# Patient Record
Sex: MALE | Race: WHITE | NOT HISPANIC OR LATINO | Employment: OTHER | URBAN - METROPOLITAN AREA
[De-identification: names, ages, dates, MRNs, and addresses within clinical notes are randomized per-mention and may not be internally consistent; named-entity substitution may affect disease eponyms.]

---

## 2017-08-30 ENCOUNTER — HOSPITAL ENCOUNTER (OUTPATIENT)
Dept: LAB | Facility: MEDICAL CENTER | Age: 77
End: 2017-08-30
Attending: FAMILY MEDICINE
Payer: MEDICARE

## 2017-08-30 ENCOUNTER — OFFICE VISIT (OUTPATIENT)
Dept: MEDICAL GROUP | Facility: PHYSICIAN GROUP | Age: 77
End: 2017-08-30
Payer: MEDICARE

## 2017-08-30 VITALS
HEIGHT: 67 IN | OXYGEN SATURATION: 97 % | DIASTOLIC BLOOD PRESSURE: 98 MMHG | SYSTOLIC BLOOD PRESSURE: 172 MMHG | WEIGHT: 208 LBS | HEART RATE: 72 BPM | TEMPERATURE: 96.6 F | BODY MASS INDEX: 32.65 KG/M2

## 2017-08-30 DIAGNOSIS — R41.81 SENILE: ICD-10-CM

## 2017-08-30 DIAGNOSIS — C85.90 NON-HODGKIN'S LYMPHOMA, UNSPECIFIED BODY REGION, UNSPECIFIED NON-HODGKIN LYMPHOMA TYPE (HCC): ICD-10-CM

## 2017-08-30 DIAGNOSIS — R42 VERTIGO: ICD-10-CM

## 2017-08-30 DIAGNOSIS — K21.9 GASTROESOPHAGEAL REFLUX DISEASE WITHOUT ESOPHAGITIS: ICD-10-CM

## 2017-08-30 DIAGNOSIS — I10 ESSENTIAL HYPERTENSION: ICD-10-CM

## 2017-08-30 LAB
ALBUMIN SERPL BCP-MCNC: 4.8 G/DL (ref 3.2–4.9)
ALBUMIN/GLOB SERPL: 1.7 G/DL
ALP SERPL-CCNC: 87 U/L (ref 30–99)
ALT SERPL-CCNC: 16 U/L (ref 2–50)
ANION GAP SERPL CALC-SCNC: 9 MMOL/L (ref 0–11.9)
AST SERPL-CCNC: 20 U/L (ref 12–45)
BASOPHILS # BLD AUTO: 0.8 % (ref 0–1.8)
BASOPHILS # BLD: 0.04 K/UL (ref 0–0.12)
BILIRUB SERPL-MCNC: 1 MG/DL (ref 0.1–1.5)
BUN SERPL-MCNC: 16 MG/DL (ref 8–22)
CALCIUM SERPL-MCNC: 9.8 MG/DL (ref 8.5–10.5)
CHLORIDE SERPL-SCNC: 106 MMOL/L (ref 96–112)
CHOLEST SERPL-MCNC: 186 MG/DL (ref 100–199)
CO2 SERPL-SCNC: 25 MMOL/L (ref 20–33)
CREAT SERPL-MCNC: 1.1 MG/DL (ref 0.5–1.4)
EOSINOPHIL # BLD AUTO: 0.21 K/UL (ref 0–0.51)
EOSINOPHIL NFR BLD: 4 % (ref 0–6.9)
ERYTHROCYTE [DISTWIDTH] IN BLOOD BY AUTOMATED COUNT: 42.2 FL (ref 35.9–50)
FASTING STATUS PATIENT QL REPORTED: NORMAL
GFR SERPL CREATININE-BSD FRML MDRD: >60 ML/MIN/1.73 M 2
GLOBULIN SER CALC-MCNC: 2.8 G/DL (ref 1.9–3.5)
GLUCOSE SERPL-MCNC: 130 MG/DL (ref 65–99)
HCT VFR BLD AUTO: 44 % (ref 42–52)
HDLC SERPL-MCNC: 27 MG/DL
HGB BLD-MCNC: 15.2 G/DL (ref 14–18)
IMM GRANULOCYTES # BLD AUTO: 0.04 K/UL (ref 0–0.11)
IMM GRANULOCYTES NFR BLD AUTO: 0.8 % (ref 0–0.9)
LDLC SERPL CALC-MCNC: 102 MG/DL
LYMPHOCYTES # BLD AUTO: 1.45 K/UL (ref 1–4.8)
LYMPHOCYTES NFR BLD: 27.7 % (ref 22–41)
MCH RBC QN AUTO: 31.1 PG (ref 27–33)
MCHC RBC AUTO-ENTMCNC: 34.5 G/DL (ref 33.7–35.3)
MCV RBC AUTO: 90 FL (ref 81.4–97.8)
MONOCYTES # BLD AUTO: 0.37 K/UL (ref 0–0.85)
MONOCYTES NFR BLD AUTO: 7.1 % (ref 0–13.4)
NEUTROPHILS # BLD AUTO: 3.12 K/UL (ref 1.82–7.42)
NEUTROPHILS NFR BLD: 59.6 % (ref 44–72)
NRBC # BLD AUTO: 0 K/UL
NRBC BLD AUTO-RTO: 0 /100 WBC
PLATELET # BLD AUTO: 113 K/UL (ref 164–446)
PMV BLD AUTO: 10.7 FL (ref 9–12.9)
POTASSIUM SERPL-SCNC: 4.4 MMOL/L (ref 3.6–5.5)
PROT SERPL-MCNC: 7.6 G/DL (ref 6–8.2)
RBC # BLD AUTO: 4.89 M/UL (ref 4.7–6.1)
SODIUM SERPL-SCNC: 140 MMOL/L (ref 135–145)
TRIGL SERPL-MCNC: 285 MG/DL (ref 0–149)
WBC # BLD AUTO: 5.2 K/UL (ref 4.8–10.8)

## 2017-08-30 PROCEDURE — 36415 COLL VENOUS BLD VENIPUNCTURE: CPT

## 2017-08-30 PROCEDURE — 82306 VITAMIN D 25 HYDROXY: CPT | Mod: GA

## 2017-08-30 PROCEDURE — 85025 COMPLETE CBC W/AUTO DIFF WBC: CPT

## 2017-08-30 PROCEDURE — 80061 LIPID PANEL: CPT

## 2017-08-30 PROCEDURE — 99214 OFFICE O/P EST MOD 30 MIN: CPT | Performed by: FAMILY MEDICINE

## 2017-08-30 PROCEDURE — 80053 COMPREHEN METABOLIC PANEL: CPT

## 2017-08-30 RX ORDER — DIAZEPAM 5 MG/1
5 TABLET ORAL EVERY 12 HOURS PRN
Qty: 30 TAB | Refills: 0 | Status: SHIPPED
Start: 2017-08-30 | End: 2018-07-02 | Stop reason: SDUPTHER

## 2017-08-30 RX ORDER — OMEPRAZOLE 20 MG/1
20 CAPSULE, DELAYED RELEASE ORAL DAILY
COMMUNITY
End: 2021-06-15

## 2017-08-30 ASSESSMENT — PATIENT HEALTH QUESTIONNAIRE - PHQ9: CLINICAL INTERPRETATION OF PHQ2 SCORE: 0

## 2017-08-31 LAB — 25(OH)D3 SERPL-MCNC: 21 NG/ML (ref 30–100)

## 2017-08-31 NOTE — PROGRESS NOTES
"Subjective:   Jerry Whitfield is a 77 y.o. male here today for establishing care and elevated blood pressure     HPI :   Patient is here to establish care.He is here with her daughter.He is generally in good health condition and does not take any daily medications.    He takes diazepam as needed occasionally when he gets vertigo.    He does have a diagnosis of hypertension but he does not take any medications.He does not believe in taking medications.He states that he recently had a few episodes of left sided chest pain.He started taking Prilosec 20 mg daily for the last few days.After taking it, his symptoms resolved.    He also reports that he had an episode of headache recently.Described as a sharp left sided headache with photophobia.It lasted for a few hours and then resolved.Did not have any nausea, vomiting.No weakness, numbness, tingling.    Patient also had NHL for which he was treated in the past.The last time he has seen his Oncologist was 2 years ago.    Current medicines (including changes today)  Current Outpatient Prescriptions   Medication Sig Dispense Refill   • omeprazole (PRILOSEC) 20 MG delayed-release capsule Take 20 mg by mouth every day.     • diazepam (VALIUM) 5 MG Tab Take 1 Tab by mouth every 12 hours as needed (for extreme dizziness ). 30 Tab 0     No current facility-administered medications for this visit.      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    ROS   No chest pain, no shortness of breath, no abdominal pain       Objective:     Blood pressure (!) 172/98, pulse 72, temperature 35.9 °C (96.6 °F), height 1.702 m (5' 7\"), weight 94.3 kg (208 lb), SpO2 97 %. Body mass index is 32.58 kg/m².     PHYSICAL EXAM     GEN: Alert and oriented,well appearing, no acute distress  SKIN: warm, dry to touch, no rashes or lesions in " visible areas  PSYCH: mood and affect normal, judgement normal  EYES: Conjunctiva clear, lids normal,pupils equal round and reactive  ENMT: Normal external nose and ears,EACs normal appearing, TM pearly gray with normal light reflex bilaterally,nasal mucosa and turbinates normal appearing without erythema or edema, lips without lesions,good dentition,oropharynx clear  Neck : Trachea midline, no masses or swelling, no thyromegaly  LYMPHATIC : No cervical or supraclavicular lymphadenopathy  RESPIRATORY : Unlabored respiratory effort, no distress noted, clear to auscultation bilaterally, no wheeze, rhonchi, crackles  CARDIOVASCULAR: RRR, S1 S2 normal, no murmurs , gallop , no carotid bruit, no edema of the extremities  MUSCULOSKELETAL : Normal gait and stance, no obvious abnormalities   NEURO: No overt focal neurologic deficits,sensation intact      Assessment and Plan:   The following treatment plan was discussed    1. Essential hypertension  Uncontrolled  Patient is reluctant to start medication now.Discussed in details the risks of untreated hypertension including MI, stroke etc.  Routine labs ordered.Patient will consider starting medication (discussed lisinopril ) after getting lab results  - CBC WITH DIFFERENTIAL; Future  - COMP METABOLIC PANEL; Future  - LIPID PROFILE; Future    2. Non-Hodgkin's lymphoma, unspecified body region, unspecified non-Hodgkin lymphoma type (CMS-HCC)  Currently in remission  Will obtain records from [facility]    3. Vertigo  Take Valium as needed.  New prescription given today  - diazepam (VALIUM) 5 MG Tab; Take 1 Tab by mouth every 12 hours as needed (for extreme dizziness ).  Dispense: 30 Tab; Refill: 0    4. Gastroesophageal reflux disease without esophagitis  Continue Prilosec 20 mg daily.Symptoms controlled.    5. Senile  - VITAMIN D,25 HYDROXY; Future      Followup: Return in about 4 weeks (around 9/27/2017) for follow up on HTN, labs, headaches, health maint, Long.         Please  note that this dictation was created using voice recognition software. I have made every reasonable attempt to correct obvious errors, but I expect that there are errors of grammar and possibly content that I did not discover before finalizing the note.

## 2017-09-03 DIAGNOSIS — R73.01 ELEVATED FASTING BLOOD SUGAR: ICD-10-CM

## 2017-09-05 ENCOUNTER — TELEPHONE (OUTPATIENT)
Dept: MEDICAL GROUP | Facility: PHYSICIAN GROUP | Age: 77
End: 2017-09-05

## 2017-09-05 NOTE — TELEPHONE ENCOUNTER
----- Message from Patricia Mascorro M.D. sent at 9/3/2017  1:58 PM PDT -----  Please inform patient that his blood shows the following abnormalities :    1. Blood sugar is high (fasting blood sugar is 130, normal < 100). We need to do another test to determine if he has diabetes. I will place an order for that (hbA1C).Please advise him to come to the lab to have it done.    2. Cholesterol is also abnormal (some of it is due to high blood sugar).Good cholesterol (hDL ) is low.    3. Vitamin D is low.He should take Vitamin D 5000 units daily.    4. I also recommend that he start a blood pressure medication as we had discussed at his appointment.    Please make him an appointment to discuss the results in more detail. He should complete the test for diabetes at least 4-5 days before the appointment.Therefore please make him an appointment in the 1-2 weeks.    Patricia Mascorro M.D.

## 2017-09-08 ENCOUNTER — HOSPITAL ENCOUNTER (OUTPATIENT)
Dept: LAB | Facility: MEDICAL CENTER | Age: 77
End: 2017-09-08
Attending: FAMILY MEDICINE
Payer: MEDICARE

## 2017-09-08 DIAGNOSIS — R73.01 ELEVATED FASTING BLOOD SUGAR: ICD-10-CM

## 2017-09-08 LAB
EST. AVERAGE GLUCOSE BLD GHB EST-MCNC: 200 MG/DL
FASTING STATUS PATIENT QL REPORTED: NORMAL
HBA1C MFR BLD: 8.6 % (ref 0–5.6)

## 2017-09-08 PROCEDURE — 36415 COLL VENOUS BLD VENIPUNCTURE: CPT | Mod: GA

## 2017-09-08 PROCEDURE — 83036 HEMOGLOBIN GLYCOSYLATED A1C: CPT | Mod: GA

## 2017-09-21 ENCOUNTER — OFFICE VISIT (OUTPATIENT)
Dept: MEDICAL GROUP | Facility: PHYSICIAN GROUP | Age: 77
End: 2017-09-21
Payer: MEDICARE

## 2017-09-21 VITALS
DIASTOLIC BLOOD PRESSURE: 82 MMHG | SYSTOLIC BLOOD PRESSURE: 146 MMHG | BODY MASS INDEX: 30.16 KG/M2 | TEMPERATURE: 96.8 F | HEIGHT: 68 IN | WEIGHT: 199 LBS | OXYGEN SATURATION: 95 % | HEART RATE: 76 BPM

## 2017-09-21 DIAGNOSIS — E11.9 TYPE 2 DIABETES MELLITUS WITHOUT COMPLICATION, WITHOUT LONG-TERM CURRENT USE OF INSULIN (HCC): ICD-10-CM

## 2017-09-21 DIAGNOSIS — E55.9 VITAMIN D INSUFFICIENCY: ICD-10-CM

## 2017-09-21 DIAGNOSIS — E78.1 HYPERTRIGLYCERIDEMIA: ICD-10-CM

## 2017-09-21 DIAGNOSIS — I10 ESSENTIAL HYPERTENSION: ICD-10-CM

## 2017-09-21 PROCEDURE — 99213 OFFICE O/P EST LOW 20 MIN: CPT | Performed by: FAMILY MEDICINE

## 2017-09-21 RX ORDER — CHLORAL HYDRATE 500 MG
1000 CAPSULE ORAL
COMMUNITY
End: 2021-06-15

## 2017-09-21 ASSESSMENT — PAIN SCALES - GENERAL: PAINLEVEL: NO PAIN

## 2017-09-21 NOTE — ASSESSMENT & PLAN NOTE
Recent labs show he has a vitamin D level of 21. He is currently taking vitamin D about 1000 units daily.

## 2017-09-21 NOTE — ASSESSMENT & PLAN NOTE
This is a new diagnosis based on recent labs. His hemoglobin A1c is 8.6. Patient does not want to try medications at this time. He wants to control his blood sugar with lifestyle changes that he is working on. He does have a son who is diabetic and who will help him to plan out his carbohydrate controlled diet.

## 2017-09-21 NOTE — ASSESSMENT & PLAN NOTE
New problem. Based on recent labs. This is most likely related to his new diagnosis of diabetes. He does not want to do medications currently. He is working on making lifestyle changes and wants to control with diet and exercise.  Results for KELVIN KAUFMAN (MRN [id number]) as of 9/21/2017 12:55   Ref. Range 8/30/2017 12:01   Cholesterol,Tot Latest Ref Range: 100 - 199 mg/dL 186   Triglycerides Latest Ref Range: 0 - 149 mg/dL 285 (H)   HDL Latest Ref Range: >=40 mg/dL 27 (A)   LDL Latest Ref Range: <100 mg/dL 102 (H)

## 2017-09-21 NOTE — ASSESSMENT & PLAN NOTE
Patient has had hypertension but has not been on any medications. On our last visit his blood pressure was as high as 172/98. However he is quite adamant on not taking medications. He states that he has been making a lot of lifestyle changes including diet and trying to start exercising. He has been trying to limit carbohydrate intake. His blood pressure is much better today although still elevated. He would like to continue working on lifestyle changes and trying to control his blood pressure. He is monitoring his blood pressure at home and it has been running between 140s to 150s/70s-80s

## 2017-09-21 NOTE — PROGRESS NOTES
Subjective:   Jerry Whitfield is a 77 y.o. male here today forReviewing recent lab results, follow-up on hypertension and a diagnosis of diabetes    HTN (hypertension)  Patient has had hypertension but has not been on any medications. On our last visit his blood pressure was as high as 172/98. However he is quite adamant on not taking medications. He states that he has been making a lot of lifestyle changes including diet and trying to start exercising. He has been trying to limit carbohydrate intake. His blood pressure is much better today although still elevated. He would like to continue working on lifestyle changes and trying to control his blood pressure. He is monitoring his blood pressure at home and it has been running between 140s to 150s/70s-80s    Vitamin D insufficiency  Recent labs show he has a vitamin D level of 21. He is currently taking vitamin D about 1000 units daily.    Type 2 diabetes mellitus without complication (CMS-[facility])  This is a new diagnosis based on recent labs. His hemoglobin A1c is 8.6. Patient does not want to try medications at this time. He wants to control his blood sugar with lifestyle changes that he is working on. He does have a son who is diabetic and who will help him to plan out his carbohydrate controlled diet.    Hypertriglyceridemia  New problem. Based on recent labs. This is most likely related to his new diagnosis of diabetes. He does not want to do medications currently. He is working on making lifestyle changes and wants to control with diet and exercise.  Results for JERRY WHITFIELD (MRN [id number]) as of 9/21/2017 12:55   Ref. Range 8/30/2017 12:01   Cholesterol,Tot Latest Ref Range: 100 - 199 mg/dL 186   Triglycerides Latest Ref Range: 0 - 149 mg/dL 285 (H)   HDL Latest Ref Range: >=40 mg/dL 27 (A)   LDL Latest Ref Range: <100 mg/dL 102 (H)          Current medicines (including changes today)  Current Outpatient Prescriptions   Medication Sig Dispense  "Refill   • aspirin EC (ECOTRIN) 81 MG Tablet Delayed Response Take 81 mg by mouth every day.     • Omega-3 Fatty Acids (FISH OIL) 1000 MG Cap capsule Take 1,000 mg by mouth 3 times a day, with meals.     • vitamin D (CHOLECALCIFEROL) 1000 UNIT Tab Take 1,000 Units by mouth every day.     • omeprazole (PRILOSEC) 20 MG delayed-release capsule Take 20 mg by mouth every day.     • diazepam (VALIUM) 5 MG Tab Take 1 Tab by mouth every 12 hours as needed (for extreme dizziness ). 30 Tab 0     No current facility-administered medications for this visit.      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    ROS   No chest pain, no shortness of breath, no abdominal pain  No cough, no rash, no weakness, no headache, no visual disturbances     Objective:     Blood pressure 146/82, pulse 76, temperature 36 °C (96.8 °F), height 1.715 m (5' 7.5\"), weight 90.3 kg (199 lb), SpO2 95 %. Body mass index is 30.71 kg/m².     PHYSICAL EXAM     GEN: Alert and oriented,well appearing, no acute distress  SKIN: warm, dry to touch, no rashes or lesions in visible areas  PSYCH: mood and affect normal, judgement normal  EYES: Conjunctiva clear, lids normal,pupils equal round and reactive  ENMT: Normal external nose and ears,EACs normal appearing, TM pearly gray with normal light reflex bilaterally,nasal mucosa and turbinates normal appearing without erythema or edema, lips without lesions,good dentition,oropharynx clear  Neck : Trachea midline, no masses or swelling, no thyromegaly  LYMPHATIC : No cervical or supraclavicular lymphadenopathy  RESPIRATORY : Unlabored respiratory effort, no distress noted, clear to auscultation bilaterally, no wheeze, rhonchi, crackles  CARDIOVASCULAR: RRR, S1 S2 normal,no edema of the extremities  MUSCULOSKELETAL : Normal gait and stance, no obvious " abnormalities   NEURO: No overt focal neurologic deficits,sensation intact        Assessment and Plan:   The following treatment plan was discussed    1. Essential hypertension  BP still elevated but improving.continue lifestyle changes.  Monitor BP at home and bring BP log    2. Vitamin D insufficiency  Patient will take Vitamin D 5000 units daily.Recehck in 6 months    3. Type 2 diabetes mellitus without complication, without long-term current use of insulin (CMS-[facility])  New dx.patient does not want medications.A1c 8.6.Goal < 7.  Patient will continue lifestyle changes, carbohydrate controlled diet and exercise.Monitor BG at home.Discussed goals of FBG < 130 ad PPBG < 200.  Return in 2 months to check A1C (patient will pay for test, since not covered by insurance)    4. Hypertriglyceridemia  Continue lifestyle changes.Recehck labs in 3 months      Followup: Return in about 2 months (around 11/21/2017) for Long, follow up on diabetes, HTN.         Please note that this dictation was created using voice recognition software. I have made every reasonable attempt to correct obvious errors, but I expect that there are errors of grammar and possibly content that I did not discover before finalizing the note.

## 2018-06-19 DIAGNOSIS — R42 VERTIGO: ICD-10-CM

## 2018-06-19 RX ORDER — DIAZEPAM 5 MG/1
TABLET ORAL
Qty: 30 TAB | Refills: 0
Start: 2018-06-19

## 2018-07-02 ENCOUNTER — OFFICE VISIT (OUTPATIENT)
Dept: MEDICAL GROUP | Facility: PHYSICIAN GROUP | Age: 78
End: 2018-07-02
Payer: MEDICARE

## 2018-07-02 VITALS
DIASTOLIC BLOOD PRESSURE: 94 MMHG | HEART RATE: 82 BPM | HEIGHT: 67 IN | WEIGHT: 206 LBS | RESPIRATION RATE: 14 BRPM | BODY MASS INDEX: 32.33 KG/M2 | SYSTOLIC BLOOD PRESSURE: 146 MMHG | TEMPERATURE: 98 F | OXYGEN SATURATION: 96 %

## 2018-07-02 DIAGNOSIS — E11.9 TYPE 2 DIABETES MELLITUS WITHOUT COMPLICATION, WITHOUT LONG-TERM CURRENT USE OF INSULIN (HCC): ICD-10-CM

## 2018-07-02 DIAGNOSIS — E66.9 OBESITY (BMI 30-39.9): ICD-10-CM

## 2018-07-02 DIAGNOSIS — R42 VERTIGO: ICD-10-CM

## 2018-07-02 DIAGNOSIS — D69.6 THROMBOCYTOPENIA (HCC): ICD-10-CM

## 2018-07-02 LAB
HBA1C MFR BLD: 7 % (ref ?–5.8)
INT CON NEG: ABNORMAL
INT CON POS: ABNORMAL

## 2018-07-02 PROCEDURE — 99214 OFFICE O/P EST MOD 30 MIN: CPT | Performed by: FAMILY MEDICINE

## 2018-07-02 PROCEDURE — 83036 HEMOGLOBIN GLYCOSYLATED A1C: CPT | Performed by: FAMILY MEDICINE

## 2018-07-02 RX ORDER — IBUPROFEN 200 MG
200 TABLET ORAL EVERY 6 HOURS PRN
COMMUNITY
End: 2021-06-15

## 2018-07-02 RX ORDER — DIAZEPAM 5 MG/1
5 TABLET ORAL EVERY 12 HOURS PRN
Qty: 30 TAB | Refills: 0 | Status: SHIPPED | OUTPATIENT
Start: 2018-07-02 | End: 2018-08-01

## 2018-07-02 ASSESSMENT — PATIENT HEALTH QUESTIONNAIRE - PHQ9: CLINICAL INTERPRETATION OF PHQ2 SCORE: 0

## 2018-07-02 NOTE — PROGRESS NOTES
"Subjective:   Jerry Whitfield is a 78 y.o. male here today for evaluation and management of:     Vertigo  Chronic problem. He uses a valium rarely on onset of vertigo which helps.   He does not take this everyday. Advised on avoiding alcohol with this medication.   Refills done  and uds reveiwed.     Type 2 diabetes mellitus without complication (CMS-HCC)  Uncontrolled a1c last year was 8.7      Thrombocytopenia  Chronic condition, due for recheck.   He has on blood in stool or gums.   He had non-hodgkin's lymphoma and had chemotherapy for this in the past   Will recheck labs.          Current medicines (including changes today)  Current Outpatient Prescriptions   Medication Sig Dispense Refill   • diazePAM (VALIUM) 5 MG Tab Take 1 Tab by mouth every 12 hours as needed (for extreme dizziness ) for up to 30 days. 30 Tab 0   • aspirin EC (ECOTRIN) 81 MG Tablet Delayed Response Take 81 mg by mouth every day.     • Omega-3 Fatty Acids (FISH OIL) 1000 MG Cap capsule Take 1,000 mg by mouth 3 times a day, with meals.     • vitamin D (CHOLECALCIFEROL) 1000 UNIT Tab Take 1,000 Units by mouth every day.     • ibuprofen (MOTRIN) 200 MG Tab Take 200 mg by mouth every 6 hours as needed.     • omeprazole (PRILOSEC) 20 MG delayed-release capsule Take 20 mg by mouth every day.       No current facility-administered medications for this visit.      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    ROS  No chest pain, no shortness of breath, no abdominal pain       Objective:     Blood pressure 146/94, pulse 82, temperature 36.7 °C (98 °F), resp. rate 14, height 1.702 m (5' 7\"), weight 93.4 kg (206 lb), SpO2 96 %. Body mass index is 32.26 kg/m².   Physical Exam:  Constitutional: Alert, no distress.  Skin: Warm, dry, good turgor, no rashes in visible areas.  Eye: Equal, round " and reactive, conjunctiva clear, lids normal.  ENMT: Lips without lesions, good dentition, oropharynx clear.  Neck: Trachea midline, no masses, no thyromegaly. No cervical or supraclavicular lymphadenopathy  Respiratory: Unlabored respiratory effort, lungs clear to auscultation, no wheezes, no ronchi.  Cardiovascular: Normal S1, S2, no murmur, no edema.  Abdomen: Soft, non-tender, no masses, no hepatosplenomegaly.  Psych: Alert and oriented x3, normal affect and mood.        Assessment and Plan:   The following treatment plan was discussed    1. Obesity (BMI 30-39.9)  - Patient identified as having weight management issue.  Appropriate orders and counseling given.    2. Vertigo  Refill provided on diazepam.     3. Type 2 diabetes mellitus without complication, without long-term current use of insulin (HCC)  Improved due for recheck on labs   - POCT A1C  - Diabetic Monofilament Lower Extremity Exam  - COMP METABOLIC PANEL; Future  - LIPID PROFILE; Future  - MICROALBUMIN CREAT RATIO URINE (LAB COLLECT); Future  - HEMOGLOBIN A1C; Future      Followup: No Follow-up on file.

## 2018-07-02 NOTE — ASSESSMENT & PLAN NOTE
Chronic problem. He uses a valium rarely on onset of vertigo which helps.   He does not take this everyday. Advised on avoiding alcohol with this medication.   Refills done  and uds reveiwed.

## 2018-07-02 NOTE — ASSESSMENT & PLAN NOTE
Chronic condition, due for recheck.   He has on blood in stool or gums.   He had non-hodgkin's lymphoma and had chemotherapy for this in the past   Will recheck labs.

## 2021-01-11 DIAGNOSIS — Z23 NEED FOR VACCINATION: ICD-10-CM

## 2021-06-15 ENCOUNTER — OFFICE VISIT (OUTPATIENT)
Dept: URGENT CARE | Facility: PHYSICIAN GROUP | Age: 81
End: 2021-06-15
Payer: MEDICARE

## 2021-06-15 ENCOUNTER — APPOINTMENT (OUTPATIENT)
Dept: RADIOLOGY | Facility: IMAGING CENTER | Age: 81
End: 2021-06-15
Attending: PHYSICIAN ASSISTANT
Payer: MEDICARE

## 2021-06-15 ENCOUNTER — HOSPITAL ENCOUNTER (OUTPATIENT)
Facility: MEDICAL CENTER | Age: 81
DRG: 661 | End: 2021-06-15
Attending: PHYSICIAN ASSISTANT
Payer: MEDICARE

## 2021-06-15 VITALS
SYSTOLIC BLOOD PRESSURE: 196 MMHG | WEIGHT: 199.08 LBS | BODY MASS INDEX: 31.25 KG/M2 | TEMPERATURE: 97.8 F | HEART RATE: 86 BPM | RESPIRATION RATE: 16 BRPM | DIASTOLIC BLOOD PRESSURE: 62 MMHG | OXYGEN SATURATION: 97 % | HEIGHT: 67 IN

## 2021-06-15 DIAGNOSIS — R10.31 RLQ ABDOMINAL PAIN: ICD-10-CM

## 2021-06-15 DIAGNOSIS — R81 GLYCOSURIA: ICD-10-CM

## 2021-06-15 DIAGNOSIS — R31.9 HEMATURIA, UNSPECIFIED TYPE: ICD-10-CM

## 2021-06-15 LAB
APPEARANCE UR: NORMAL
BILIRUB UR STRIP-MCNC: NEGATIVE MG/DL
COLOR UR AUTO: NORMAL
GLUCOSE BLD-MCNC: 165 MG/DL (ref 70–100)
GLUCOSE UR STRIP.AUTO-MCNC: 100 MG/DL
KETONES UR STRIP.AUTO-MCNC: 15 MG/DL
LEUKOCYTE ESTERASE UR QL STRIP.AUTO: NORMAL
NITRITE UR QL STRIP.AUTO: NEGATIVE
PH UR STRIP.AUTO: 5 [PH] (ref 5–8)
PROT UR QL STRIP: 100 MG/DL
RBC UR QL AUTO: NORMAL
SP GR UR STRIP.AUTO: 1.03
UROBILINOGEN UR STRIP-MCNC: NEGATIVE MG/DL

## 2021-06-15 PROCEDURE — 87086 URINE CULTURE/COLONY COUNT: CPT

## 2021-06-15 PROCEDURE — 74019 RADEX ABDOMEN 2 VIEWS: CPT | Mod: TC,FY | Performed by: PHYSICIAN ASSISTANT

## 2021-06-15 PROCEDURE — 82962 GLUCOSE BLOOD TEST: CPT | Performed by: PHYSICIAN ASSISTANT

## 2021-06-15 PROCEDURE — 81002 URINALYSIS NONAUTO W/O SCOPE: CPT | Performed by: PHYSICIAN ASSISTANT

## 2021-06-15 PROCEDURE — 99214 OFFICE O/P EST MOD 30 MIN: CPT | Performed by: PHYSICIAN ASSISTANT

## 2021-06-15 NOTE — PROGRESS NOTES
Chief Complaint   Patient presents with   • Abdominal Pain     right lower abdomen pain       HISTORY OF PRESENT ILLNESS: Patient is a 81 y.o. male who presents today for the following:    Patient is here with his daughter for evaluation of right lower quadrant pain.  His first episode was Sunday, 6/13, in the morning.  He had pain most of the day Sunday when it resolved.  No pain Monday.  His pain started again this morning with associated nausea.  The pain is constant and sharp.  He states preceding both episodes, the night before, he had the same cheese from the [employer] store.  He had slight provement in his pain this morning after moving his bowels.  He states he typically very regular when moving his bowels has been more irregular, small bowel movements today and Sunday.  Denies fever, chills, body aches.  He denies urinary symptoms and history of renal stones.  He still has an appendix.    Patient Active Problem List    Diagnosis Date Noted   • Obesity (BMI 30-39.9) 07/02/2018   • Vitamin D insufficiency 09/21/2017   • Type 2 diabetes mellitus without complication (HCC) 09/21/2017   • Hypertriglyceridemia 09/21/2017   • Essential hypertension 08/30/2017   • Gastroesophageal reflux disease without esophagitis 08/30/2017   • Senile 08/30/2017   • History of asbestos exposure 06/12/2015   • Elevated fasting glucose 06/12/2015   • Thrombocytopenia (HCC) 06/12/2015   • Vertigo 04/16/2015   • Non-Hodgkin's lymphoma (HCC) 09/04/2013   • HTN (hypertension) 09/04/2013   • Alcohol abuse, in remission 09/04/2013   • B12 deficiency 09/04/2013   • 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    No current [facility]-ordered outpatient medications on file.     No current [facility]-ordered facility-administered medications on file.       Past Medical History:   Diagnosis Date   • Alcohol abuse, in remission 9/4/2013    Quit 1983   • Elevated TSH 9/4/2013   • Hypertension     no meds,  "history of High Bp   • Non-Hodgkin's lymphoma (HCC) 2013    B cell DrDuarte?Sancho, onc bx done by dr alexandre F/u with sancho ?   • Tobacco abuse, in remission 2013    Chewed /7 x 20 years; quit early        Social History     Tobacco Use   • Smoking status: Former Smoker     Packs/day: 0.50     Years: 5.00     Pack years: 2.50     Types: Cigarettes   • Smokeless tobacco: Former User     Types: Chew     Quit date: 1994   • Tobacco comment: quit chew ; chewed x 20 years   Substance Use Topics   • Alcohol use: No   • Drug use: No       Family Status   Relation Name Status   • Mo   at age 90   • Fa   at age 53        esophageal ulcers   • Sis  Alive   • Bro  Alive   • Bro     • Neg Hx  (Not Specified)     Family History   Problem Relation Age of Onset   • Cancer Neg Hx    • Diabetes Neg Hx    • Heart Disease Neg Hx    • Stroke Neg Hx    • Hypertension Neg Hx        Review of Systems:    Constitutional ROS: No unexpected change in weight, No weakness, No fatigue  Eye ROS: No recent significant change in vision, No eye pain, redness, discharge  Ear ROS: No drainage, No tinnitus or vertigo, No recent change in hearing  Mouth/Throat ROS: No teeth or gum problems, No bleeding gums, No tongue complaints  Neck ROS: No swollen glands, No significant pain in neck  Pulmonary ROS: No chronic cough, sputum, or hemoptysis, No dyspnea on exertion, No wheezing  Cardiovascular ROS: No diaphoresis, No edema, No palpitations  GI: Positive for nausea, vomiting  Musculoskeletal/Extremities ROS: No peripheral edema, No pain, redness or swelling on the joints  Hematologic/Lymphatic ROS: No chills, No night sweats, No weight loss  Skin/Integumentary ROS: No edema, No evidence of rash, No itching      Exam:  BP (!) 196/62   Pulse 86   Temp 36.6 °C (97.8 °F)   Resp 16   Ht 1.702 m (5' 7\")   Wt 90.3 kg (199 lb 1.2 oz)   SpO2 97%   General: Well developed, well nourished. No distress.  "   Pulmonary: Unlabored respiratory effort.    Back: No CVA tenderness noted.  Neurologic: Grossly nonfocal. No facial asymmetry noted.  Abdomen: Soft, nondistended, nontender to palpation.  Bowel sounds within normal limits.  Skin: Warm, dry, good turgor. No rashes in visible areas.   Psych: Normal mood. Alert and oriented to person, place and time.    UA: Positive glucose, blood, protein, trace leukocyte esterase    B    2 view abdomen, per radiology  1.  8 mm oval calcification in between the right L1 and L2 transverse processes which could be in the distribution of the right proximal ureter.     2.  Nonspecific bowel gas pattern in the right lower quadrant.     3.  Degenerative changes in the bony structures.    Assessment/Plan:  Discussed differential diagnosis with patient including but not limited to renal stone, constipation, appendicitis, among others.  Referring patient to urology for hematuria.  CT renal colic order provided to be scheduled as an outpatient.  Patient to start trial of MiraLAX to see if this helps alleviate his symptoms.  Discussed red flags and ER precautions.  1. RLQ abdominal pain  POCT Urinalysis    [id number] VIEWS    URINE CULTURE(NEW)   2. Hematuria, unspecified type  CT-RENAL COLIC EVALUATION(A/P W/O)    REFERRAL TO UROLOGY   3. Glycosuria  POCT glucose

## 2021-06-16 ENCOUNTER — HOSPITAL ENCOUNTER (OUTPATIENT)
Dept: RADIOLOGY | Facility: MEDICAL CENTER | Age: 81
End: 2021-06-16
Attending: PHYSICIAN ASSISTANT
Payer: MEDICARE

## 2021-06-16 ENCOUNTER — HOSPITAL ENCOUNTER (INPATIENT)
Facility: MEDICAL CENTER | Age: 81
LOS: 2 days | DRG: 661 | End: 2021-06-18
Attending: EMERGENCY MEDICINE | Admitting: STUDENT IN AN ORGANIZED HEALTH CARE EDUCATION/TRAINING PROGRAM
Payer: MEDICARE

## 2021-06-16 DIAGNOSIS — N28.9 ACUTE RENAL INSUFFICIENCY: ICD-10-CM

## 2021-06-16 DIAGNOSIS — R10.9 FLANK PAIN: ICD-10-CM

## 2021-06-16 DIAGNOSIS — N20.1 URETEROLITHIASIS: ICD-10-CM

## 2021-06-16 DIAGNOSIS — R31.9 HEMATURIA, UNSPECIFIED TYPE: ICD-10-CM

## 2021-06-16 PROBLEM — N39.0 UTI (URINARY TRACT INFECTION) WITH PYURIA: Status: ACTIVE | Noted: 2021-06-16

## 2021-06-16 PROBLEM — E66.3 OVERWEIGHT: Status: ACTIVE | Noted: 2021-06-16

## 2021-06-16 PROBLEM — N17.9 AKI (ACUTE KIDNEY INJURY) (HCC): Status: ACTIVE | Noted: 2021-06-16

## 2021-06-16 PROBLEM — N13.9 OBSTRUCTIVE UROPATHY: Status: ACTIVE | Noted: 2021-06-16

## 2021-06-16 PROBLEM — N13.2 HYDRONEPHROSIS WITH URINARY OBSTRUCTION DUE TO RENAL CALCULUS: Status: ACTIVE | Noted: 2021-06-16

## 2021-06-16 LAB
ALBUMIN SERPL BCP-MCNC: 4.4 G/DL (ref 3.2–4.9)
ALBUMIN/GLOB SERPL: 1.6 G/DL
ALP SERPL-CCNC: 100 U/L (ref 30–99)
ALT SERPL-CCNC: 18 U/L (ref 2–50)
ANION GAP SERPL CALC-SCNC: 11 MMOL/L (ref 7–16)
APPEARANCE UR: ABNORMAL
AST SERPL-CCNC: 21 U/L (ref 12–45)
BACTERIA #/AREA URNS HPF: NEGATIVE /HPF
BASOPHILS # BLD AUTO: 0.5 % (ref 0–1.8)
BASOPHILS # BLD: 0.03 K/UL (ref 0–0.12)
BILIRUB SERPL-MCNC: 0.7 MG/DL (ref 0.1–1.5)
BILIRUB UR QL STRIP.AUTO: NEGATIVE
BUN SERPL-MCNC: 27 MG/DL (ref 8–22)
CALCIUM SERPL-MCNC: 8.6 MG/DL (ref 8.5–10.5)
CHLORIDE SERPL-SCNC: 107 MMOL/L (ref 96–112)
CO2 SERPL-SCNC: 22 MMOL/L (ref 20–33)
COLOR UR: YELLOW
CREAT SERPL-MCNC: 2.19 MG/DL (ref 0.5–1.4)
EOSINOPHIL # BLD AUTO: 0.13 K/UL (ref 0–0.51)
EOSINOPHIL NFR BLD: 2.2 % (ref 0–6.9)
EPI CELLS #/AREA URNS HPF: ABNORMAL /HPF
ERYTHROCYTE [DISTWIDTH] IN BLOOD BY AUTOMATED COUNT: 42 FL (ref 35.9–50)
FLUAV RNA SPEC QL NAA+PROBE: NEGATIVE
FLUBV RNA SPEC QL NAA+PROBE: NEGATIVE
GLOBULIN SER CALC-MCNC: 2.8 G/DL (ref 1.9–3.5)
GLUCOSE SERPL-MCNC: 195 MG/DL (ref 65–99)
GLUCOSE UR STRIP.AUTO-MCNC: 500 MG/DL
HCT VFR BLD AUTO: 40.9 % (ref 42–52)
HGB BLD-MCNC: 14.2 G/DL (ref 14–18)
HYALINE CASTS #/AREA URNS LPF: ABNORMAL /LPF
IMM GRANULOCYTES # BLD AUTO: 0.03 K/UL (ref 0–0.11)
IMM GRANULOCYTES NFR BLD AUTO: 0.5 % (ref 0–0.9)
KETONES UR STRIP.AUTO-MCNC: ABNORMAL MG/DL
LEUKOCYTE ESTERASE UR QL STRIP.AUTO: ABNORMAL
LIPASE SERPL-CCNC: 15 U/L (ref 11–82)
LYMPHOCYTES # BLD AUTO: 1.43 K/UL (ref 1–4.8)
LYMPHOCYTES NFR BLD: 24.7 % (ref 22–41)
MCH RBC QN AUTO: 30.8 PG (ref 27–33)
MCHC RBC AUTO-ENTMCNC: 34.7 G/DL (ref 33.7–35.3)
MCV RBC AUTO: 88.7 FL (ref 81.4–97.8)
MICRO URNS: ABNORMAL
MONOCYTES # BLD AUTO: 0.46 K/UL (ref 0–0.85)
MONOCYTES NFR BLD AUTO: 7.9 % (ref 0–13.4)
MUCOUS THREADS #/AREA URNS HPF: ABNORMAL /HPF
NEUTROPHILS # BLD AUTO: 3.71 K/UL (ref 1.82–7.42)
NEUTROPHILS NFR BLD: 64.2 % (ref 44–72)
NITRITE UR QL STRIP.AUTO: NEGATIVE
NRBC # BLD AUTO: 0 K/UL
NRBC BLD-RTO: 0 /100 WBC
PH UR STRIP.AUTO: 5 [PH] (ref 5–8)
PLATELET # BLD AUTO: 120 K/UL (ref 164–446)
PMV BLD AUTO: 10.8 FL (ref 9–12.9)
POTASSIUM SERPL-SCNC: 4 MMOL/L (ref 3.6–5.5)
PROT SERPL-MCNC: 7.2 G/DL (ref 6–8.2)
PROT UR QL STRIP: 30 MG/DL
RBC # BLD AUTO: 4.61 M/UL (ref 4.7–6.1)
RBC # URNS HPF: ABNORMAL /HPF
RBC UR QL AUTO: ABNORMAL
RENAL EPI CELLS #/AREA URNS HPF: ABNORMAL /HPF
RSV RNA SPEC QL NAA+PROBE: NEGATIVE
SARS-COV-2 RNA RESP QL NAA+PROBE: NOTDETECTED
SODIUM SERPL-SCNC: 140 MMOL/L (ref 135–145)
SP GR UR STRIP.AUTO: 1.02
SPECIMEN SOURCE: NORMAL
URATE CRY #/AREA URNS HPF: POSITIVE /HPF
UROBILINOGEN UR STRIP.AUTO-MCNC: 0.2 MG/DL
WBC # BLD AUTO: 5.8 K/UL (ref 4.8–10.8)
WBC #/AREA URNS HPF: ABNORMAL /HPF

## 2021-06-16 PROCEDURE — 700111 HCHG RX REV CODE 636 W/ 250 OVERRIDE (IP): Performed by: STUDENT IN AN ORGANIZED HEALTH CARE EDUCATION/TRAINING PROGRAM

## 2021-06-16 PROCEDURE — 87086 URINE CULTURE/COLONY COUNT: CPT

## 2021-06-16 PROCEDURE — 80053 COMPREHEN METABOLIC PANEL: CPT

## 2021-06-16 PROCEDURE — 83690 ASSAY OF LIPASE: CPT

## 2021-06-16 PROCEDURE — 99285 EMERGENCY DEPT VISIT HI MDM: CPT

## 2021-06-16 PROCEDURE — 85025 COMPLETE CBC W/AUTO DIFF WBC: CPT

## 2021-06-16 PROCEDURE — 96365 THER/PROPH/DIAG IV INF INIT: CPT

## 2021-06-16 PROCEDURE — 0241U HCHG SARS-COV-2 COVID-19 NFCT DS RESP RNA 4 TRGT MIC: CPT

## 2021-06-16 PROCEDURE — 99223 1ST HOSP IP/OBS HIGH 75: CPT | Mod: AI | Performed by: STUDENT IN AN ORGANIZED HEALTH CARE EDUCATION/TRAINING PROGRAM

## 2021-06-16 PROCEDURE — 81001 URINALYSIS AUTO W/SCOPE: CPT

## 2021-06-16 PROCEDURE — C9803 HOPD COVID-19 SPEC COLLECT: HCPCS | Performed by: EMERGENCY MEDICINE

## 2021-06-16 PROCEDURE — 770004 HCHG ROOM/CARE - ONCOLOGY PRIVATE *

## 2021-06-16 PROCEDURE — 74176 CT ABD & PELVIS W/O CONTRAST: CPT | Mod: MG

## 2021-06-16 RX ORDER — OXYCODONE HYDROCHLORIDE 5 MG/1
2.5 TABLET ORAL
Status: DISCONTINUED | OUTPATIENT
Start: 2021-06-16 | End: 2021-06-17

## 2021-06-16 RX ORDER — ONDANSETRON 2 MG/ML
4 INJECTION INTRAMUSCULAR; INTRAVENOUS EVERY 4 HOURS PRN
Status: DISCONTINUED | OUTPATIENT
Start: 2021-06-16 | End: 2021-06-18 | Stop reason: HOSPADM

## 2021-06-16 RX ORDER — ACETAMINOPHEN 325 MG/1
650 TABLET ORAL EVERY 6 HOURS PRN
Status: DISCONTINUED | OUTPATIENT
Start: 2021-06-16 | End: 2021-06-18 | Stop reason: HOSPADM

## 2021-06-16 RX ORDER — POLYETHYLENE GLYCOL 3350 17 G/17G
1 POWDER, FOR SOLUTION ORAL
Status: DISCONTINUED | OUTPATIENT
Start: 2021-06-16 | End: 2021-06-17

## 2021-06-16 RX ORDER — ENALAPRILAT 1.25 MG/ML
1.25 INJECTION INTRAVENOUS EVERY 6 HOURS PRN
Status: DISCONTINUED | OUTPATIENT
Start: 2021-06-16 | End: 2021-06-17

## 2021-06-16 RX ORDER — VITAMIN B COMPLEX
1000 TABLET ORAL DAILY
Status: DISCONTINUED | OUTPATIENT
Start: 2021-06-17 | End: 2021-06-17

## 2021-06-16 RX ORDER — SODIUM CHLORIDE, SODIUM LACTATE, POTASSIUM CHLORIDE, CALCIUM CHLORIDE 600; 310; 30; 20 MG/100ML; MG/100ML; MG/100ML; MG/100ML
3000 INJECTION, SOLUTION INTRAVENOUS CONTINUOUS
Status: DISCONTINUED | OUTPATIENT
Start: 2021-06-16 | End: 2021-06-17

## 2021-06-16 RX ORDER — OXYCODONE HYDROCHLORIDE 5 MG/1
5 TABLET ORAL
Status: DISCONTINUED | OUTPATIENT
Start: 2021-06-16 | End: 2021-06-17

## 2021-06-16 RX ORDER — LABETALOL HYDROCHLORIDE 5 MG/ML
10 INJECTION, SOLUTION INTRAVENOUS EVERY 4 HOURS PRN
Status: DISCONTINUED | OUTPATIENT
Start: 2021-06-16 | End: 2021-06-17

## 2021-06-16 RX ORDER — CEFAZOLIN SODIUM 2 G/100ML
2 INJECTION, SOLUTION INTRAVENOUS EVERY 8 HOURS
Status: DISCONTINUED | OUTPATIENT
Start: 2021-06-16 | End: 2021-06-18

## 2021-06-16 RX ORDER — BISACODYL 10 MG
10 SUPPOSITORY, RECTAL RECTAL
Status: DISCONTINUED | OUTPATIENT
Start: 2021-06-16 | End: 2021-06-17

## 2021-06-16 RX ORDER — SODIUM CHLORIDE, SODIUM LACTATE, POTASSIUM CHLORIDE, AND CALCIUM CHLORIDE .6; .31; .03; .02 G/100ML; G/100ML; G/100ML; G/100ML
500 INJECTION, SOLUTION INTRAVENOUS
Status: DISCONTINUED | OUTPATIENT
Start: 2021-06-16 | End: 2021-06-18 | Stop reason: HOSPADM

## 2021-06-16 RX ORDER — AMOXICILLIN 250 MG
2 CAPSULE ORAL 2 TIMES DAILY
Status: DISCONTINUED | OUTPATIENT
Start: 2021-06-17 | End: 2021-06-17

## 2021-06-16 RX ORDER — MORPHINE SULFATE 4 MG/ML
2 INJECTION, SOLUTION INTRAMUSCULAR; INTRAVENOUS
Status: DISCONTINUED | OUTPATIENT
Start: 2021-06-16 | End: 2021-06-17

## 2021-06-16 RX ORDER — DEXTROSE MONOHYDRATE 25 G/50ML
50 INJECTION, SOLUTION INTRAVENOUS
Status: DISCONTINUED | OUTPATIENT
Start: 2021-06-16 | End: 2021-06-18 | Stop reason: HOSPADM

## 2021-06-16 RX ORDER — HEPARIN SODIUM 5000 [USP'U]/ML
5000 INJECTION, SOLUTION INTRAVENOUS; SUBCUTANEOUS EVERY 8 HOURS
Status: DISCONTINUED | OUTPATIENT
Start: 2021-06-17 | End: 2021-06-18 | Stop reason: HOSPADM

## 2021-06-16 RX ORDER — ONDANSETRON 4 MG/1
4 TABLET, ORALLY DISINTEGRATING ORAL EVERY 4 HOURS PRN
Status: DISCONTINUED | OUTPATIENT
Start: 2021-06-16 | End: 2021-06-18 | Stop reason: HOSPADM

## 2021-06-16 RX ADMIN — CEFAZOLIN SODIUM 2 G: 2 INJECTION, SOLUTION INTRAVENOUS at 22:47

## 2021-06-16 ASSESSMENT — ENCOUNTER SYMPTOMS
CHILLS: 0
FALLS: 0
BLURRED VISION: 0
MYALGIAS: 0
COUGH: 0
FLANK PAIN: 1
ABDOMINAL PAIN: 0
SHORTNESS OF BREATH: 0
HEMOPTYSIS: 0
DEPRESSION: 0
FOCAL WEAKNESS: 0
HEARTBURN: 0
BRUISES/BLEEDS EASILY: 0
HEADACHES: 0
NAUSEA: 1
PALPITATIONS: 0
DIZZINESS: 0
DOUBLE VISION: 0
VOMITING: 0
FEVER: 0
WEAKNESS: 0

## 2021-06-16 ASSESSMENT — LIFESTYLE VARIABLES
TOTAL SCORE: 0
HAVE PEOPLE ANNOYED YOU BY CRITICIZING YOUR DRINKING: NO
DO YOU DRINK ALCOHOL: YES
TOTAL SCORE: 0
HAVE YOU EVER FELT YOU SHOULD CUT DOWN ON YOUR DRINKING: NO
EVER FELT BAD OR GUILTY ABOUT YOUR DRINKING: NO
TOTAL SCORE: 0
CONSUMPTION TOTAL: INCOMPLETE
EVER HAD A DRINK FIRST THING IN THE MORNING TO STEADY YOUR NERVES TO GET RID OF A HANGOVER: NO

## 2021-06-17 ENCOUNTER — APPOINTMENT (OUTPATIENT)
Dept: RADIOLOGY | Facility: MEDICAL CENTER | Age: 81
DRG: 661 | End: 2021-06-17
Attending: UROLOGY
Payer: MEDICARE

## 2021-06-17 ENCOUNTER — ANESTHESIA (OUTPATIENT)
Dept: SURGERY | Facility: MEDICAL CENTER | Age: 81
DRG: 661 | End: 2021-06-17
Payer: MEDICARE

## 2021-06-17 ENCOUNTER — ANESTHESIA EVENT (OUTPATIENT)
Dept: SURGERY | Facility: MEDICAL CENTER | Age: 81
DRG: 661 | End: 2021-06-17
Payer: MEDICARE

## 2021-06-17 PROBLEM — N12 PYELONEPHRITIS: Status: ACTIVE | Noted: 2021-06-16

## 2021-06-17 PROBLEM — E55.9 VITAMIN D INSUFFICIENCY: Status: RESOLVED | Noted: 2017-09-21 | Resolved: 2021-06-17

## 2021-06-17 LAB
ALBUMIN SERPL BCP-MCNC: 3.6 G/DL (ref 3.2–4.9)
BASOPHILS # BLD AUTO: 1 % (ref 0–1.8)
BASOPHILS # BLD: 0.05 K/UL (ref 0–0.12)
BUN SERPL-MCNC: 27 MG/DL (ref 8–22)
CALCIUM SERPL-MCNC: 8.7 MG/DL (ref 8.5–10.5)
CHLORIDE SERPL-SCNC: 106 MMOL/L (ref 96–112)
CO2 SERPL-SCNC: 24 MMOL/L (ref 20–33)
CREAT SERPL-MCNC: 2.17 MG/DL (ref 0.5–1.4)
EKG IMPRESSION: NORMAL
EOSINOPHIL # BLD AUTO: 0.14 K/UL (ref 0–0.51)
EOSINOPHIL NFR BLD: 2.9 % (ref 0–6.9)
ERYTHROCYTE [DISTWIDTH] IN BLOOD BY AUTOMATED COUNT: 42.5 FL (ref 35.9–50)
EST. AVERAGE GLUCOSE BLD GHB EST-MCNC: 177 MG/DL
GLUCOSE BLD-MCNC: 124 MG/DL (ref 65–99)
GLUCOSE BLD-MCNC: 134 MG/DL (ref 65–99)
GLUCOSE BLD-MCNC: 142 MG/DL (ref 65–99)
GLUCOSE BLD-MCNC: 199 MG/DL (ref 65–99)
GLUCOSE SERPL-MCNC: 138 MG/DL (ref 65–99)
HBA1C MFR BLD: 7.8 % (ref 4–5.6)
HCT VFR BLD AUTO: 41 % (ref 42–52)
HGB BLD-MCNC: 13.8 G/DL (ref 14–18)
IMM GRANULOCYTES # BLD AUTO: 0.03 K/UL (ref 0–0.11)
IMM GRANULOCYTES NFR BLD AUTO: 0.6 % (ref 0–0.9)
LYMPHOCYTES # BLD AUTO: 1.2 K/UL (ref 1–4.8)
LYMPHOCYTES NFR BLD: 25.2 % (ref 22–41)
MAGNESIUM SERPL-MCNC: 2.2 MG/DL (ref 1.5–2.5)
MCH RBC QN AUTO: 30.7 PG (ref 27–33)
MCHC RBC AUTO-ENTMCNC: 33.7 G/DL (ref 33.7–35.3)
MCV RBC AUTO: 91.1 FL (ref 81.4–97.8)
MONOCYTES # BLD AUTO: 0.42 K/UL (ref 0–0.85)
MONOCYTES NFR BLD AUTO: 8.8 % (ref 0–13.4)
NEUTROPHILS # BLD AUTO: 2.93 K/UL (ref 1.82–7.42)
NEUTROPHILS NFR BLD: 61.5 % (ref 44–72)
NRBC # BLD AUTO: 0 K/UL
NRBC BLD-RTO: 0 /100 WBC
PHOSPHATE SERPL-MCNC: 2.7 MG/DL (ref 2.5–4.5)
PLATELET # BLD AUTO: 96 K/UL (ref 164–446)
PMV BLD AUTO: 11.2 FL (ref 9–12.9)
POTASSIUM SERPL-SCNC: 4.2 MMOL/L (ref 3.6–5.5)
RBC # BLD AUTO: 4.5 M/UL (ref 4.7–6.1)
SODIUM SERPL-SCNC: 137 MMOL/L (ref 135–145)
URATE SERPL-MCNC: 5.9 MG/DL (ref 2.5–8.3)
WBC # BLD AUTO: 4.8 K/UL (ref 4.8–10.8)

## 2021-06-17 PROCEDURE — 160002 HCHG RECOVERY MINUTES (STAT): Performed by: UROLOGY

## 2021-06-17 PROCEDURE — 700101 HCHG RX REV CODE 250: Performed by: ANESTHESIOLOGY

## 2021-06-17 PROCEDURE — 0TC68ZZ EXTIRPATION OF MATTER FROM RIGHT URETER, VIA NATURAL OR ARTIFICIAL OPENING ENDOSCOPIC: ICD-10-PCS | Performed by: UROLOGY

## 2021-06-17 PROCEDURE — 500879 HCHG PACK, CYSTO: Performed by: UROLOGY

## 2021-06-17 PROCEDURE — C1769 GUIDE WIRE: HCPCS | Performed by: UROLOGY

## 2021-06-17 PROCEDURE — C1758 CATHETER, URETERAL: HCPCS | Performed by: UROLOGY

## 2021-06-17 PROCEDURE — 160035 HCHG PACU - 1ST 60 MINS PHASE I: Performed by: UROLOGY

## 2021-06-17 PROCEDURE — 82365 CALCULUS SPECTROSCOPY: CPT

## 2021-06-17 PROCEDURE — 85025 COMPLETE CBC W/AUTO DIFF WBC: CPT

## 2021-06-17 PROCEDURE — 160036 HCHG PACU - EA ADDL 30 MINS PHASE I: Performed by: UROLOGY

## 2021-06-17 PROCEDURE — 83036 HEMOGLOBIN GLYCOSYLATED A1C: CPT

## 2021-06-17 PROCEDURE — 88300 SURGICAL PATH GROSS: CPT | Mod: 59

## 2021-06-17 PROCEDURE — 700102 HCHG RX REV CODE 250 W/ 637 OVERRIDE(OP): Performed by: STUDENT IN AN ORGANIZED HEALTH CARE EDUCATION/TRAINING PROGRAM

## 2021-06-17 PROCEDURE — 83735 ASSAY OF MAGNESIUM: CPT

## 2021-06-17 PROCEDURE — 0TC08ZZ EXTIRPATION OF MATTER FROM RIGHT KIDNEY, VIA NATURAL OR ARTIFICIAL OPENING ENDOSCOPIC: ICD-10-PCS | Performed by: UROLOGY

## 2021-06-17 PROCEDURE — 700111 HCHG RX REV CODE 636 W/ 250 OVERRIDE (IP): Performed by: STUDENT IN AN ORGANIZED HEALTH CARE EDUCATION/TRAINING PROGRAM

## 2021-06-17 PROCEDURE — 0T768DZ DILATION OF RIGHT URETER WITH INTRALUMINAL DEVICE, VIA NATURAL OR ARTIFICIAL OPENING ENDOSCOPIC: ICD-10-PCS | Performed by: UROLOGY

## 2021-06-17 PROCEDURE — A9270 NON-COVERED ITEM OR SERVICE: HCPCS | Performed by: STUDENT IN AN ORGANIZED HEALTH CARE EDUCATION/TRAINING PROGRAM

## 2021-06-17 PROCEDURE — 99232 SBSQ HOSP IP/OBS MODERATE 35: CPT | Performed by: STUDENT IN AN ORGANIZED HEALTH CARE EDUCATION/TRAINING PROGRAM

## 2021-06-17 PROCEDURE — 93010 ELECTROCARDIOGRAM REPORT: CPT | Performed by: INTERNAL MEDICINE

## 2021-06-17 PROCEDURE — 770004 HCHG ROOM/CARE - ONCOLOGY PRIVATE *

## 2021-06-17 PROCEDURE — 700111 HCHG RX REV CODE 636 W/ 250 OVERRIDE (IP): Performed by: ANESTHESIOLOGY

## 2021-06-17 PROCEDURE — C2617 STENT, NON-COR, TEM W/O DEL: HCPCS | Performed by: UROLOGY

## 2021-06-17 PROCEDURE — 160039 HCHG SURGERY MINUTES - EA ADDL 1 MIN LEVEL 3: Performed by: UROLOGY

## 2021-06-17 PROCEDURE — 160028 HCHG SURGERY MINUTES - 1ST 30 MINS LEVEL 3: Performed by: UROLOGY

## 2021-06-17 PROCEDURE — 160048 HCHG OR STATISTICAL LEVEL 1-5: Performed by: UROLOGY

## 2021-06-17 PROCEDURE — 0TCB8ZZ EXTIRPATION OF MATTER FROM BLADDER, VIA NATURAL OR ARTIFICIAL OPENING ENDOSCOPIC: ICD-10-PCS | Performed by: UROLOGY

## 2021-06-17 PROCEDURE — 700105 HCHG RX REV CODE 258: Performed by: STUDENT IN AN ORGANIZED HEALTH CARE EDUCATION/TRAINING PROGRAM

## 2021-06-17 PROCEDURE — 502240 HCHG MISC OR SUPPLY RC 0272: Performed by: UROLOGY

## 2021-06-17 PROCEDURE — C1894 INTRO/SHEATH, NON-LASER: HCPCS | Performed by: UROLOGY

## 2021-06-17 PROCEDURE — 82962 GLUCOSE BLOOD TEST: CPT | Mod: 91

## 2021-06-17 PROCEDURE — 160009 HCHG ANES TIME/MIN: Performed by: UROLOGY

## 2021-06-17 PROCEDURE — 80069 RENAL FUNCTION PANEL: CPT

## 2021-06-17 PROCEDURE — 501329 HCHG SET, CYSTO IRRIG Y TUR: Performed by: UROLOGY

## 2021-06-17 PROCEDURE — 84550 ASSAY OF BLOOD/URIC ACID: CPT

## 2021-06-17 PROCEDURE — 93005 ELECTROCARDIOGRAM TRACING: CPT | Performed by: UROLOGY

## 2021-06-17 PROCEDURE — 700117 HCHG RX CONTRAST REV CODE 255: Performed by: UROLOGY

## 2021-06-17 DEVICE — STENT PERCUFLEX PLUS 4.8X22: Type: IMPLANTABLE DEVICE | Site: URETER | Status: FUNCTIONAL

## 2021-06-17 RX ORDER — OXYCODONE HCL 5 MG/5 ML
10 SOLUTION, ORAL ORAL
Status: DISCONTINUED | OUTPATIENT
Start: 2021-06-17 | End: 2021-06-17 | Stop reason: HOSPADM

## 2021-06-17 RX ORDER — OXYCODONE HYDROCHLORIDE 10 MG/1
10 TABLET ORAL
Status: DISCONTINUED | OUTPATIENT
Start: 2021-06-17 | End: 2021-06-18 | Stop reason: HOSPADM

## 2021-06-17 RX ORDER — ONDANSETRON 2 MG/ML
4 INJECTION INTRAMUSCULAR; INTRAVENOUS
Status: DISCONTINUED | OUTPATIENT
Start: 2021-06-17 | End: 2021-06-17 | Stop reason: HOSPADM

## 2021-06-17 RX ORDER — BISACODYL 10 MG
10 SUPPOSITORY, RECTAL RECTAL
Status: DISCONTINUED | OUTPATIENT
Start: 2021-06-17 | End: 2021-06-18 | Stop reason: HOSPADM

## 2021-06-17 RX ORDER — DIPHENHYDRAMINE HYDROCHLORIDE 50 MG/ML
12.5 INJECTION INTRAMUSCULAR; INTRAVENOUS
Status: DISCONTINUED | OUTPATIENT
Start: 2021-06-17 | End: 2021-06-17 | Stop reason: HOSPADM

## 2021-06-17 RX ORDER — AMOXICILLIN 250 MG
2 CAPSULE ORAL 2 TIMES DAILY PRN
Status: DISCONTINUED | OUTPATIENT
Start: 2021-06-17 | End: 2021-06-18 | Stop reason: HOSPADM

## 2021-06-17 RX ORDER — POLYETHYLENE GLYCOL 3350 17 G/17G
1 POWDER, FOR SOLUTION ORAL
Status: DISCONTINUED | OUTPATIENT
Start: 2021-06-17 | End: 2021-06-18 | Stop reason: HOSPADM

## 2021-06-17 RX ORDER — OXYCODONE HYDROCHLORIDE 5 MG/1
5 TABLET ORAL
Status: DISCONTINUED | OUTPATIENT
Start: 2021-06-17 | End: 2021-06-18 | Stop reason: HOSPADM

## 2021-06-17 RX ORDER — CEFAZOLIN SODIUM 1 G/3ML
INJECTION, POWDER, FOR SOLUTION INTRAMUSCULAR; INTRAVENOUS PRN
Status: DISCONTINUED | OUTPATIENT
Start: 2021-06-17 | End: 2021-06-17 | Stop reason: SURG

## 2021-06-17 RX ORDER — OXYCODONE HCL 5 MG/5 ML
5 SOLUTION, ORAL ORAL
Status: DISCONTINUED | OUTPATIENT
Start: 2021-06-17 | End: 2021-06-17 | Stop reason: HOSPADM

## 2021-06-17 RX ORDER — DEXAMETHASONE SODIUM PHOSPHATE 4 MG/ML
INJECTION, SOLUTION INTRA-ARTICULAR; INTRALESIONAL; INTRAMUSCULAR; INTRAVENOUS; SOFT TISSUE PRN
Status: DISCONTINUED | OUTPATIENT
Start: 2021-06-17 | End: 2021-06-17 | Stop reason: SURG

## 2021-06-17 RX ORDER — SODIUM CHLORIDE, SODIUM LACTATE, POTASSIUM CHLORIDE, CALCIUM CHLORIDE 600; 310; 30; 20 MG/100ML; MG/100ML; MG/100ML; MG/100ML
3000 INJECTION, SOLUTION INTRAVENOUS ONCE
Status: COMPLETED | OUTPATIENT
Start: 2021-06-17 | End: 2021-06-17

## 2021-06-17 RX ORDER — HYDRALAZINE HYDROCHLORIDE 20 MG/ML
10 INJECTION INTRAMUSCULAR; INTRAVENOUS EVERY 6 HOURS PRN
Status: DISCONTINUED | OUTPATIENT
Start: 2021-06-17 | End: 2021-06-18 | Stop reason: HOSPADM

## 2021-06-17 RX ORDER — MORPHINE SULFATE 4 MG/ML
4 INJECTION, SOLUTION INTRAMUSCULAR; INTRAVENOUS
Status: DISCONTINUED | OUTPATIENT
Start: 2021-06-17 | End: 2021-06-18 | Stop reason: HOSPADM

## 2021-06-17 RX ORDER — SODIUM CHLORIDE, SODIUM LACTATE, POTASSIUM CHLORIDE, CALCIUM CHLORIDE 600; 310; 30; 20 MG/100ML; MG/100ML; MG/100ML; MG/100ML
INJECTION, SOLUTION INTRAVENOUS CONTINUOUS
Status: DISCONTINUED | OUTPATIENT
Start: 2021-06-17 | End: 2021-06-17 | Stop reason: HOSPADM

## 2021-06-17 RX ORDER — HALOPERIDOL 5 MG/ML
1 INJECTION INTRAMUSCULAR
Status: DISCONTINUED | OUTPATIENT
Start: 2021-06-17 | End: 2021-06-17 | Stop reason: HOSPADM

## 2021-06-17 RX ORDER — LIDOCAINE HYDROCHLORIDE 20 MG/ML
INJECTION, SOLUTION EPIDURAL; INFILTRATION; INTRACAUDAL; PERINEURAL PRN
Status: DISCONTINUED | OUTPATIENT
Start: 2021-06-17 | End: 2021-06-17 | Stop reason: SURG

## 2021-06-17 RX ADMIN — OXYCODONE HYDROCHLORIDE 5 MG: 5 TABLET ORAL at 20:04

## 2021-06-17 RX ADMIN — HEPARIN SODIUM 5000 UNITS: 5000 INJECTION, SOLUTION INTRAVENOUS; SUBCUTANEOUS at 20:04

## 2021-06-17 RX ADMIN — HEPARIN SODIUM 5000 UNITS: 5000 INJECTION, SOLUTION INTRAVENOUS; SUBCUTANEOUS at 06:20

## 2021-06-17 RX ADMIN — ONDANSETRON 8 MG: 2 INJECTION INTRAMUSCULAR; INTRAVENOUS at 15:08

## 2021-06-17 RX ADMIN — CEFAZOLIN SODIUM 2 G: 2 INJECTION, SOLUTION INTRAVENOUS at 13:46

## 2021-06-17 RX ADMIN — ACETAMINOPHEN 650 MG: 325 TABLET, FILM COATED ORAL at 18:17

## 2021-06-17 RX ADMIN — HYDRALAZINE HYDROCHLORIDE 10 MG: 20 INJECTION INTRAMUSCULAR; INTRAVENOUS at 21:36

## 2021-06-17 RX ADMIN — DEXAMETHASONE SODIUM PHOSPHATE 8 MG: 4 INJECTION, SOLUTION INTRA-ARTICULAR; INTRALESIONAL; INTRAMUSCULAR; INTRAVENOUS; SOFT TISSUE at 15:08

## 2021-06-17 RX ADMIN — CEFAZOLIN 2 G: 330 INJECTION, POWDER, FOR SOLUTION INTRAMUSCULAR; INTRAVENOUS at 15:08

## 2021-06-17 RX ADMIN — LIDOCAINE HYDROCHLORIDE 100 MG: 20 INJECTION, SOLUTION EPIDURAL; INFILTRATION; INTRACAUDAL at 15:21

## 2021-06-17 RX ADMIN — Medication 1000 UNITS: at 06:20

## 2021-06-17 RX ADMIN — SODIUM CHLORIDE, POTASSIUM CHLORIDE, SODIUM LACTATE AND CALCIUM CHLORIDE 3000 ML: 600; 310; 30; 20 INJECTION, SOLUTION INTRAVENOUS at 07:56

## 2021-06-17 RX ADMIN — SODIUM CHLORIDE, POTASSIUM CHLORIDE, SODIUM LACTATE AND CALCIUM CHLORIDE 3000 ML: 600; 310; 30; 20 INJECTION, SOLUTION INTRAVENOUS at 00:18

## 2021-06-17 RX ADMIN — CEFAZOLIN SODIUM 2 G: 2 INJECTION, SOLUTION INTRAVENOUS at 07:56

## 2021-06-17 RX ADMIN — PROPOFOL 150 MG: 10 INJECTION, EMULSION INTRAVENOUS at 15:21

## 2021-06-17 RX ADMIN — INSULIN HUMAN 2 UNITS: 100 INJECTION, SOLUTION PARENTERAL at 20:37

## 2021-06-17 RX ADMIN — SODIUM CHLORIDE, POTASSIUM CHLORIDE, SODIUM LACTATE AND CALCIUM CHLORIDE 1000 ML: 600; 310; 30; 20 INJECTION, SOLUTION INTRAVENOUS at 20:04

## 2021-06-17 RX ADMIN — CEFAZOLIN SODIUM 2 G: 2 INJECTION, SOLUTION INTRAVENOUS at 21:36

## 2021-06-17 RX ADMIN — SODIUM CHLORIDE, POTASSIUM CHLORIDE, SODIUM LACTATE AND CALCIUM CHLORIDE 3000 ML: 600; 310; 30; 20 INJECTION, SOLUTION INTRAVENOUS at 17:57

## 2021-06-17 ASSESSMENT — COGNITIVE AND FUNCTIONAL STATUS - GENERAL
MOVING FROM LYING ON BACK TO SITTING ON SIDE OF FLAT BED: A LITTLE
STANDING UP FROM CHAIR USING ARMS: A LITTLE
SUGGESTED CMS G CODE MODIFIER DAILY ACTIVITY: CH
DAILY ACTIVITIY SCORE: 24
DRESSING REGULAR LOWER BODY CLOTHING: A LITTLE
SUGGESTED CMS G CODE MODIFIER MOBILITY: CK
HELP NEEDED FOR BATHING: A LITTLE
MOBILITY SCORE: 19
DAILY ACTIVITIY SCORE: 21
SUGGESTED CMS G CODE MODIFIER MOBILITY: CH
MOBILITY SCORE: 24
WALKING IN HOSPITAL ROOM: A LITTLE
TOILETING: A LITTLE
CLIMB 3 TO 5 STEPS WITH RAILING: A LITTLE
SUGGESTED CMS G CODE MODIFIER DAILY ACTIVITY: CJ
TURNING FROM BACK TO SIDE WHILE IN FLAT BAD: A LITTLE

## 2021-06-17 ASSESSMENT — LIFESTYLE VARIABLES
HAVE PEOPLE ANNOYED YOU BY CRITICIZING YOUR DRINKING: NO
ON A TYPICAL DAY WHEN YOU DRINK ALCOHOL HOW MANY DRINKS DO YOU HAVE: 0
TOTAL SCORE: 0
AVERAGE NUMBER OF DAYS PER WEEK YOU HAVE A DRINK CONTAINING ALCOHOL: 0
DOES PATIENT WANT TO STOP DRINKING: NO
ALCOHOL_USE: YES
HAVE YOU EVER FELT YOU SHOULD CUT DOWN ON YOUR DRINKING: NO
EVER HAD A DRINK FIRST THING IN THE MORNING TO STEADY YOUR NERVES TO GET RID OF A HANGOVER: NO
HOW MANY TIMES IN THE PAST YEAR HAVE YOU HAD 5 OR MORE DRINKS IN A DAY: 0
EVER FELT BAD OR GUILTY ABOUT YOUR DRINKING: NO
TOTAL SCORE: 0
TOTAL SCORE: 0
CONSUMPTION TOTAL: NEGATIVE

## 2021-06-17 ASSESSMENT — ENCOUNTER SYMPTOMS
VOMITING: 0
DEPRESSION: 0
NAUSEA: 0
ABDOMINAL PAIN: 0
DIARRHEA: 0
SORE THROAT: 0
BACK PAIN: 0
FLANK PAIN: 1
SINUS PAIN: 0
NECK PAIN: 0
HEMOPTYSIS: 0
SHORTNESS OF BREATH: 0
FEVER: 0
WEAKNESS: 0
CONSTIPATION: 0
HEADACHES: 0
CHILLS: 0
EYE PAIN: 0
NERVOUS/ANXIOUS: 0
MYALGIAS: 0
BLOOD IN STOOL: 0

## 2021-06-17 ASSESSMENT — PATIENT HEALTH QUESTIONNAIRE - PHQ9
1. LITTLE INTEREST OR PLEASURE IN DOING THINGS: NOT AT ALL
SUM OF ALL RESPONSES TO PHQ9 QUESTIONS 1 AND 2: 0
2. FEELING DOWN, DEPRESSED, IRRITABLE, OR HOPELESS: NOT AT ALL

## 2021-06-17 ASSESSMENT — PAIN DESCRIPTION - PAIN TYPE
TYPE: ACUTE PAIN

## 2021-06-17 ASSESSMENT — PAIN SCALES - GENERAL: PAIN_LEVEL: 1

## 2021-06-17 NOTE — CARE PLAN
Problem: Pain - Standard  Goal: Alleviation of pain or a reduction in pain to the patient’s comfort goal  Outcome: Progressing  Flowsheets (Taken 6/17/2021 0800)  Pain Rating Scale (NPRS): 3  Note: Pt educated on non-pharmacologic pain management measures. PRN pain medication available. Continuously assessing pts pain rating and need for intervention.       Problem: Respiratory  Goal: Patient will achieve/maintain optimum respiratory ventilation and gas exchange  Outcome: Progressing  Flowsheets  Taken 6/17/2021 0800 by Alyssia Ga R.N.  Deep Breathe and Cough: Performs Correctly  Taken 6/17/2021 0748 by Wayne Figueroa  O2 Delivery Device: None - Room Air  Note: Patient effectively using incentive spirometer, turning, coughing, and deep breathing.  Oxygen being adjusted and lowered based on pt tolerance. Appropriate PRN respiratory medications ordered and available. Will continue to monitor.     The patient is Stable - Low risk of patient condition declining or worsening    Shift Goals  Clinical Goals: surgery at 1530    Progress made toward(s) clinical / shift goals:  surgery at 1530    Patient is not progressing towards the following goals:

## 2021-06-17 NOTE — ED PROVIDER NOTES
"ED Provider  Scribed for Deshawn Stanford D.O. by Juliana Muller. 6/16/2021  9:15 PM    Means of arrival: walk-in  History obtained from:patient  History limited by: none    CHIEF COMPLAINT  Chief Complaint   Patient presents with    Flank Pain     Pt told by MD he has a kidney stone and told to come to ER to \"break it up\". Complains of flank pain and bloody urine        HPI  Jerry Whitfield is a 81 y.o. male who presents for worsening right flank pain and hematuria onset a few days ago. The pain is intermittent. Patient was seen at Urgent Care yesterday and had a CT which showed he had a kidney stone. He was told to come to the ED by his provider. Patient denies any nausea, voiting, fever, sweats, or frequent urination. He reports history of kidney stone many years ago. He denies history of any kidney problems.     REVIEW OF SYSTEMS  See HPI for further details. All other systems are negative.     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    SOCIAL HISTORY  Social History     Tobacco Use    Smoking status: Former Smoker     Packs/day: 0.50     Years: 5.00     Pack years: 2.50     Types: Cigarettes    Smokeless tobacco: Former User     Types: Chew     Quit date: 1/1/1994    Tobacco comment: quit chew 1990; chewed x 20 years   Substance and Sexual Activity    Alcohol use: No    Drug use: No    Sexual activity: Not reported       SURGICAL HISTORY   has a past surgical history that includes hernia repair (2002); orbitotomy (8/6/2013); and biopsy general (8/6/2013).    CURRENT MEDICATIONS  None reported.     ALLERGIES  No Known Allergies    PHYSICAL EXAM  VITAL SIGNS: BP (!) 193/91   Pulse 84   Temp 37.4 °C (99.4 °F) (Temporal)   Resp 20   Ht 1.702 m (5' 7\")   Wt 86.8 kg (191 lb 5.8 oz)   SpO2 96%   BMI 29.97 kg/m²   Constitutional: " Alert in no apparent distress.  HENT: No signs of trauma, mucous membranes are moist  Eyes: Conjunctiva normal, Non-icteric.   Neck: Normal range of motion, No tenderness, Supple.  Lymphatic: No lymphadenopathy noted.   Cardiovascular: Regular rate and rhythm, no murmurs.   Thorax & Lungs: Normal breath sounds, No respiratory distress, No wheezing, No chest tenderness.   Abdomen: Bowel sounds normal, Soft, No masses, No pulsatile masses. No peritoneal signs.  Skin: Warm, Dry, normal color.   Back: No bony tenderness, No CVA tenderness.   Extremities: No edema, No tenderness, No cyanosis  Musculoskeletal: Good range of motion in all major joints. No tenderness to palpation or major deformities noted.   Neurologic: Alert and oriented x4, Normal motor function, Normal sensory function, No focal deficits noted.   Psychiatric: Affect normal, Judgment normal, Mood normal.         DIAGNOSTIC STUDIES / PROCEDURES    LABS  Results for orders placed or performed during the hospital encounter of 06/16/21   URINALYSIS CULTURE, IF INDICATED    Specimen: Urine   Result Value Ref Range    Color Yellow     Character Cloudy (A)     Specific Gravity 1.021 <1.035    Ph 5.0 5.0 - 8.0    Glucose 500 (A) Negative mg/dL    Ketones Trace (A) Negative mg/dL    Protein 30 (A) Negative mg/dL    Bilirubin Negative Negative    Urobilinogen, Urine 0.2 Negative    Nitrite Negative Negative    Leukocyte Esterase Small (A) Negative    Occult Blood Moderate (A) Negative    Micro Urine Req Microscopic    CBC WITH DIFFERENTIAL   Result Value Ref Range    WBC 5.8 4.8 - 10.8 K/uL    RBC 4.61 (L) 4.70 - 6.10 M/uL    Hemoglobin 14.2 14.0 - 18.0 g/dL    Hematocrit 40.9 (L) 42.0 - 52.0 %    MCV 88.7 81.4 - 97.8 fL    MCH 30.8 27.0 - 33.0 pg    MCHC 34.7 33.7 - 35.3 g/dL    RDW 42.0 35.9 - 50.0 fL    Platelet Count 120 (L) 164 - 446 K/uL    MPV 10.8 9.0 - 12.9 fL    Neutrophils-Polys 64.20 44.00 - 72.00 %    Lymphocytes 24.70 22.00 - 41.00 %    Monocytes  7.90 0.00 - 13.40 %    Eosinophils 2.20 0.00 - 6.90 %    Basophils 0.50 0.00 - 1.80 %    Immature Granulocytes 0.50 0.00 - 0.90 %    Nucleated RBC 0.00 /100 WBC    Neutrophils (Absolute) 3.71 1.82 - 7.42 K/uL    Lymphs (Absolute) 1.43 1.00 - 4.80 K/uL    Monos (Absolute) 0.46 0.00 - 0.85 K/uL    Eos (Absolute) 0.13 0.00 - 0.51 K/uL    Baso (Absolute) 0.03 0.00 - 0.12 K/uL    Immature Granulocytes (abs) 0.03 0.00 - 0.11 K/uL    NRBC (Absolute) 0.00 K/uL   COMP METABOLIC PANEL   Result Value Ref Range    Sodium 140 135 - 145 mmol/L    Potassium 4.0 3.6 - 5.5 mmol/L    Chloride 107 96 - 112 mmol/L    Co2 22 20 - 33 mmol/L    Anion Gap 11.0 7.0 - 16.0    Glucose 195 (H) 65 - 99 mg/dL    Bun 27 (H) 8 - 22 mg/dL    Creatinine 2.19 (H) 0.50 - 1.40 mg/dL    Calcium 8.6 8.5 - 10.5 mg/dL    AST(SGOT) 21 12 - 45 U/L    ALT(SGPT) 18 2 - 50 U/L    Alkaline Phosphatase 100 (H) 30 - 99 U/L    Total Bilirubin 0.7 0.1 - 1.5 mg/dL    Albumin 4.4 3.2 - 4.9 g/dL    Total Protein 7.2 6.0 - 8.2 g/dL    Globulin 2.8 1.9 - 3.5 g/dL    A-G Ratio 1.6 g/dL   LIPASE   Result Value Ref Range    Lipase 15 11 - 82 U/L   URINE MICROSCOPIC (W/UA)   Result Value Ref Range    WBC  (A) /hpf    RBC 10-20 (A) /hpf    Bacteria Negative None /hpf    Epithelial Cells Few /hpf    Epithelial Cells Renal Few /hpf    Mucous Threads Few /hpf    Uric Acid Crystal Positive /hpf    Hyaline Cast 3-5 (A) /lpf   ESTIMATED GFR   Result Value Ref Range    GFR If African American 35 (A) >60 mL/min/1.73 m 2    GFR If Non  29 (A) >60 mL/min/1.73 m 2   COV-2, FLU A/B, AND RSV BY PCR (2-4 HOURS CEPHEID): Collect NP swab in VTM    Specimen: Respirate   Result Value Ref Range    Influenza virus A RNA Negative Negative    Influenza virus B, PCR Negative Negative    RSV, PCR Negative Negative    SARS-CoV-2 by PCR NotDetected     SARS-CoV-2 Source NP Swab    URINE CULTURE(NEW)    Specimen: Urine   Result Value Ref Range    Significant Indicator NEG      Source UR     Site -     Culture Result -       All labs reviewed by me.    COURSE  Pertinent Labs & Imaging studies reviewed. (See chart for details)    Reviewed CT from yesterday which shows 9 mm right uretal stone proximally.     9:15 PM - Patient seen and examined at bedside. Discussed plan of care. Ordered for urine microscopic, CBC with diff, CMP, lipase, and UA culture to evaluate his symptoms.     9:54 PM - Paged urology.     9:58 PM - Paged hospitalist.     9:59 PM I discussed the patient's case and the above findings with Dr. Conde (Hospitalist), discussed patient findings and plan for admission.     10:02 PM - I discussed the patient's case and the above findings with Dr. Meehan (Hospitalist) who agreed to accept the patient.      MEDICAL DECISION MAKING  This is a 81 y.o. male who presents with complaints of a right-sided flank pain, now to the right lower quadrant.  The pain was severe yesterday was seen by primary doctor and outpatient ultrasound that showed a 9 mm proximal right ureteral stone.  His pain is minimal at this time.  His lab test shows a creatinine elevation greater than 2.  His prior creatinine was 3 years ago at which time was normal.  With the acute renal insufficiency, and obstructive stone patient will be admitted for further evaluation and treatment.      DISPOSITION:  Patient will be hospitalized by Dr. Meehan in guarded condition.     FINAL IMPRESSION  1. Flank pain    2. Ureterolithiasis    3. Acute renal insufficiency         Juliana DEAL (Liliana), am scribing for, and in the presence of, Deshawn Stanford D.O..    Electronically signed by: Juliana Muller (Liliana), 6/16/2021    Deshawn DEAL D.O. personally performed the services described in this documentation, as scribed by Juliana Muller in my presence, and it is both accurate and complete. C    The note accurately reflects work and decisions made by me.  Deshawn Stanford D.O.  6/17/2021  12:18 AM

## 2021-06-17 NOTE — ASSESSMENT & PLAN NOTE
Likely due to obstructive uropathy  Underwent ureteroscopy with stone extraction and stent 6/17  Repeat AM BMP

## 2021-06-17 NOTE — ANESTHESIA PREPROCEDURE EVALUATION
Relevant Problems   NEURO   (positive) History of asbestos exposure      CARDIAC   (positive) Essential hypertension   (positive) HTN (hypertension)      GI   (positive) Gastroesophageal reflux disease without esophagitis         (positive) LORENE (acute kidney injury) (HCC)   (positive) Hydronephrosis with urinary obstruction due to renal calculus      ENDO   (positive) Type 2 diabetes mellitus without complication (HCC)       Physical Exam    Airway   Mallampati: II  TM distance: >3 FB  Neck ROM: full       Cardiovascular - normal exam  Rhythm: regular  Rate: normal  (-) murmur     Dental - normal exam           Pulmonary - normal exam  Breath sounds clear to auscultation     Abdominal    Neurological - normal exam                 Anesthesia Plan    ASA 2 (obstructive uropathy with ARF)- EMERGENT   ASA physical status emergent criteria: compromised vital organ, limb or tissue and sepsis    Plan - general       Airway plan will be LMA          Induction: intravenous    Postoperative Plan: Postoperative administration of opioids is intended.    Pertinent diagnostic labs and testing reviewed    Informed Consent:    Anesthetic plan and risks discussed with patient.    Use of blood products discussed with: patient whom consented to blood products.

## 2021-06-17 NOTE — ANESTHESIA POSTPROCEDURE EVALUATION
Patient: Jerry Whitfield    Procedure Summary     Date: 06/17/21 Room / Location: [place] [address] / SURGERY [facility]    Anesthesia Start: 1508 Anesthesia Stop: 1648    Procedures:       CYSTOSCOPY, WITH URETERAL STENT INSERTION (Right Ureter)      URETEROSCOPY (Right Ureter)      LITHOTRIPSY, USING LASER (Right Ureter) Diagnosis: (RIGHT URETER STONE, RIGHT RENAL STONE)    Surgeons: Urban Chung M.D. Responsible Provider: Antonio Urena M.D.    Anesthesia Type: general ASA Status: 2 - Emergent          Final Anesthesia Type: general  Last vitals  BP   Blood Pressure : (P) 129/58    Temp   (P) 36.4 °C (97.5 °F)    Pulse   (P) 71   Resp   (P) 15    SpO2   (P) 99 %      Anesthesia Post Evaluation    Patient location during evaluation: PACU  Patient participation: complete - patient participated  Level of consciousness: awake and alert  Pain score: 1    Airway patency: patent  Anesthetic complications: no  Cardiovascular status: hemodynamically stable  Respiratory status: acceptable  Hydration status: euvolemic    PONV: none          No complications documented.     Nurse Pain Score: 3 (NPRS)

## 2021-06-17 NOTE — CONSULTS
"Urology consult note,  Reason for consultation: 9mm right ureteral stone    Overnight Events: presents with obstructing right ureter stone. No infection. No prior episodes  S: No fevers, chills, nausea or vomiting.       O:   /82   Pulse 68   Temp 36.1 °C (97 °F) (Temporal)   Resp 18   Ht 1.702 m (5' 7\")   Wt 86.8 kg (191 lb 5.8 oz)   SpO2 96%   Recent Labs     06/16/21 2035 06/17/21  0651   SODIUM 140 137   POTASSIUM 4.0 4.2   CHLORIDE 107 106   CO2 22 24   GLUCOSE 195* 138*   BUN 27* 27*   CREATININE 2.19* 2.17*   CALCIUM 8.6 8.7     Recent Labs     06/16/21 2035 06/17/21  0651   WBC 5.8 4.8   RBC 4.61* 4.50*   HEMOGLOBIN 14.2 13.8*   HEMATOCRIT 40.9* 41.0*   MCV 88.7 91.1   MCH 30.8 30.7   MCHC 34.7 33.7   RDW 42.0 42.5   PLATELETCT 120* 96*   MPV 10.8 11.2         Exam:  Abdomen soft, benign. 1+ r cvat   Urine: clear      A/P:    Active Hospital Problems    Diagnosis    • Overweight [E66.3]    • Obstructive uropathy [N13.9]    • UTI (urinary tract infection) with pyuria [N39.0]    • LORENE (acute kidney injury) ([facility]) [N17.9]    • Hydronephrosis with urinary obstruction due to renal calculus [N13.2]    • Vitamin D insufficiency [E55.9]    • Thrombocytopenia ([facility]) [D69.6]    • Hyperglycemia [R73.9]        Stable.   New recommendations: ureteroscopy, laser lithotripsy. Other options discussed as well. Risks and benefits reviewed. He is informed and wishes to proceed. Understands may not be able to reach stone and a second attempt may be needed     "

## 2021-06-17 NOTE — ASSESSMENT & PLAN NOTE
A1c 7.8  Not on any treatment PTA  Unknown baseline Cr, may have CKD from diabetic nephropathy  Microalbumin measurement after discharge when LORENE / nephrolithiasis resolves  Initiate metformin on discharge  Glycemic control mian-operative, discontinued SSI / POCT post-procedure

## 2021-06-17 NOTE — H&P
"Hospital Medicine History & Physical Note    Date of Service  6/16/2021    Primary Care Physician  Dick Saucedo M.D.    Consultants  Urology    Code Status  Full Code    Chief Complaint  Chief Complaint   Patient presents with   • Flank Pain     Pt told by MD he has a kidney stone and told to come to ER to \"break it up\". Complains of flank pain and bloody urine        History of Presenting Illness  81 y.o. male with no known past medical history who presented 6/16/2021 with several days of right groin and flank pain.  He had outpatient abdominal x-ray yesterday noted 8 mm obstructed right-sided ureteral stone, subsequently sent to ER for evaluation.  CTAP in ER noted 9 mm proximal right ureter stone with obstructive uropathy.  Urology has been consulted, plan for intervention in a.m.  Admitted to medicine service for further evaluation and treatment.    Review of Systems  Review of Systems   Constitutional: Positive for malaise/fatigue. Negative for chills and fever.   HENT: Negative for hearing loss and tinnitus.    Eyes: Negative for blurred vision and double vision.   Respiratory: Negative for cough, hemoptysis and shortness of breath.    Cardiovascular: Negative for chest pain, palpitations and leg swelling.   Gastrointestinal: Positive for nausea. Negative for abdominal pain, heartburn and vomiting.   Genitourinary: Positive for dysuria, flank pain, hematuria and urgency. Negative for frequency.   Musculoskeletal: Negative for falls and myalgias.   Skin: Negative for itching and rash.   Neurological: Negative for dizziness, focal weakness, weakness and headaches.   Endo/Heme/Allergies: Negative for environmental allergies. Does not bruise/bleed easily.   Psychiatric/Behavioral: Negative for depression and suicidal ideas.   All other systems reviewed and are negative.      Past Medical History   has a past medical history of Alcohol abuse, in remission (9/4/2013), Elevated TSH (9/4/2013), Hypertension, " Non-Hodgkin's lymphoma (HCC) (9/4/2013), and Tobacco abuse, in remission (9/4/2013).    Surgical History   has a past surgical history that includes hernia repair (2002); orbitotomy (8/6/2013); and biopsy general (8/6/2013).     Family History  family history is not on file.   Son has history of recurrent nephrolithiasis  Denies FH of CVA/MI    Social History   reports that he has quit smoking. His smoking use included cigarettes. He has a 2.50 pack-year smoking history. He quit smokeless tobacco use about 27 years ago.  His smokeless tobacco use included chew. He reports that he does not drink alcohol and does not use drugs.    Allergies  No Known Allergies    Medications  None       Physical Exam  Temp:  [37.4 °C (99.4 °F)] 37.4 °C (99.4 °F)  Pulse:  [81-92] 81  Resp:  [11-20] 16  BP: (132-193)/(68-91) 132/68  SpO2:  [94 %-96 %] 96 %    Physical Exam  Vitals and nursing note reviewed.   Constitutional:       General: He is not in acute distress.     Appearance: He is not ill-appearing.   HENT:      Head: Normocephalic and atraumatic.      Nose: Nose normal.      Mouth/Throat:      Mouth: Mucous membranes are dry.      Pharynx: Oropharynx is clear.   Eyes:      General: No scleral icterus.     Extraocular Movements: Extraocular movements intact.   Cardiovascular:      Rate and Rhythm: Normal rate and regular rhythm.      Pulses: Normal pulses.      Heart sounds: No friction rub.   Pulmonary:      Effort: Pulmonary effort is normal. No respiratory distress.      Breath sounds: No wheezing or rhonchi.   Abdominal:      General: There is no distension.      Palpations: Abdomen is soft.      Tenderness: There is no abdominal tenderness. There is right CVA tenderness. There is no guarding or rebound.   Musculoskeletal:         General: No tenderness. Normal range of motion.      Cervical back: Neck supple. No tenderness.   Skin:     General: Skin is warm and dry.      Capillary Refill: Capillary refill takes 2 to 3  seconds.   Neurological:      General: No focal deficit present.      Mental Status: He is alert and oriented to person, place, and time.      Motor: No weakness.   Psychiatric:         Mood and Affect: Mood normal.         Laboratory:  Recent Labs     06/16/21 2035   WBC 5.8   RBC 4.61*   HEMOGLOBIN 14.2   HEMATOCRIT 40.9*   MCV 88.7   MCH 30.8   MCHC 34.7   RDW 42.0   PLATELETCT 120*   MPV 10.8     Recent Labs     06/16/21 2035   SODIUM 140   POTASSIUM 4.0   CHLORIDE 107   CO2 22   GLUCOSE 195*   BUN 27*   CREATININE 2.19*   CALCIUM 8.6     Recent Labs     06/16/21 2035   ALTSGPT 18   ASTSGOT 21   ALKPHOSPHAT 100*   TBILIRUBIN 0.7   LIPASE 15   GLUCOSE 195*         No results for input(s): NTPROBNP in the last 72 hours.      No results for input(s): TROPONINT in the last 72 hours.    Imaging:  No orders to display         Assessment/Plan:  I anticipate this patient will require at least two midnights for appropriate medical management, necessitating inpatient admission.    * Obstructive uropathy  Assessment & Plan  CTAP: 9 mm proximal right ureteral stone with obstructive uropathy    IVF  Pain control  ABX: Ancef  Urology consulted, plan for intervention in a.m.    Hydronephrosis with urinary obstruction due to renal calculus  Assessment & Plan  Mild, right    LORENE (acute kidney injury) (HCC)  Assessment & Plan  Secondary to obstructive uropathy  BUN/Cr 27/2.19    UTI (urinary tract infection) with pyuria  Assessment & Plan  UA pyuria  Plan as noted in obstructive uropathy    Vitamin D insufficiency- (present on admission)  Assessment & Plan  supplement    Overweight  Assessment & Plan  Diet and lifestyle modification    Thrombocytopenia (HCC)- (present on admission)  Assessment & Plan  Monitor, no gross bleeding  D/c VTE ppx if PLT < 100k    Hyperglycemia  Assessment & Plan  ?h/o DM not on meds  ISS  F/u HbA1c

## 2021-06-17 NOTE — ANESTHESIA TIME REPORT
Anesthesia Start and Stop Event Times     Date Time Event    6/17/2021 1508 Anesthesia Start     1510 Ready for Procedure     1648 Anesthesia Stop        Responsible Staff  06/17/21    Name Role Begin End    Antonio Urena M.D. Anesth 1508 1648        Preop Diagnosis (Free Text):  Pre-op Diagnosis     RIGHT URETER STONE, RIGHT RENAL STONE        Preop Diagnosis (Codes):    Post op Diagnosis  Obstructive uropathy      Premium Reason  A. 3PM - 7AM    Comments:

## 2021-06-17 NOTE — THERAPY
Physical Therapy Contact Note    PT consult received; new admit awaiting ureteroscopy, laser lithotripsy; will follow up post for accurate assessment of dc/PT needs;     Martha ANDRES, PT,  [phone]

## 2021-06-17 NOTE — PROGRESS NOTES
4 Eyes Skin Assessment Completed by Janel Lux, RN and Linnea, RN.    Head Scab  Ears WDL  Nose WDL  Mouth WDL  Neck WDL  Breast/Chest WDL  Shoulder Blades WDL  Spine WDL  (R) Arm/Elbow/Hand WDL  (L) Arm/Elbow/Hand Scab to L hand, dime-sized.  Abdomen WDL  Groin WDL  Scrotum/Coccyx/Buttocks Discoloration to scrotum and penis  (R) Leg WDL  (L) Leg WDL  (R) Heel/Foot/Toe WDL  (L) Heel/Foot/Toe WDL          Devices In Places N/A      Interventions In Place Waffle Overlay and Pillows    Possible Skin Injury No    Pictures Uploaded Into Epic N/A  Wound Consult Placed N/A  RN Wound Prevention Protocol Ordered No

## 2021-06-17 NOTE — ANESTHESIA PROCEDURE NOTES
Airway    Date/Time: 6/17/2021 3:21 PM  Performed by: Antonio Urena M.D.  Authorized by: Antonio Urena M.D.     Location:  OR  Urgency:  Elective  Indications for Airway Management:  Anesthesia      Spontaneous Ventilation: absent    Sedation Level:  Deep  Preoxygenated: Yes    Mask Difficulty Assessment:  0 - not attempted  Final Airway Type:  Supraglottic airway  Final Supraglottic Airway:  Standard LMA    SGA Size:  4  Number of Attempts at Approach:  1

## 2021-06-17 NOTE — ASSESSMENT & PLAN NOTE
Flank pain and UA +pyuria likely due to nephrolithiasis, no nitrites  Afebrile, no N/V, no dysuria  Urine culture NGTD, discontinue antibiotics after stent removal tomorrow

## 2021-06-17 NOTE — OR SURGEON
Immediate Post OP Note    PreOp Diagnosis: rt ureteral stone, rt renal stone      PostOp Diagnosis: same      Procedure(s):  CYSTOSCOPY, WITH URETERAL STENT INSERTION - Wound Class: Clean Contaminated  URETEROSCOPY - Wound Class: Clean Contaminated  LITHOTRIPSY, USING LASER - Wound Class: Clean Contaminated    Surgeon(s):  Urban Chung M.D.    Anesthesiologist/Type of Anesthesia:  Anesthesiologist: Antonio Urena M.D./General    Surgical Staff:  Circulator: Lacho Mcduffie R.N.; Amanda Wright R.N.  Laser Staff: Hua Juan  Scrub Person: Gillian Zamorano    Specimens removed if any:  ID Type Source Tests Collected by Time Destination   A : BLADDER STONES Stone Bladder PATHOLOGY SPECIMEN Urban Chung M.D. 6/17/2021 1550    B : RIGHT RENAL STONE Other Other PATHOLOGY SPECIMEN Urban Chung M.D. 6/17/2021 1627        Estimated Blood Loss: minimal    Findings: stone in prox ureter and right lower pole    Complications: none        6/17/2021 4:48 PM Urban Chung M.D.

## 2021-06-17 NOTE — CARE PLAN
The patient is Stable - Low risk of patient condition declining or worsening         Progress made toward(s) clinical / shift goals:    Problem: Pain - Standard  Goal: Alleviation of pain or a reduction in pain to the patient’s comfort goal  Outcome: Progressing     Problem: Hemodynamics  Goal: Patient's hemodynamics, fluid balance and neurologic status will be stable or improve  Outcome: Progressing     Problem: Fluid Volume  Goal: Fluid volume balance will be maintained  Outcome: Progressing     Problem: Urinary - Renal Perfusion  Goal: Ability to achieve and maintain adequate renal perfusion and functioning will improve  Outcome: Progressing     Problem: Respiratory  Goal: Patient will achieve/maintain optimum respiratory ventilation and gas exchange  Outcome: Progressing     Problem: Physical Regulation  Goal: Diagnostic test results will improve  Outcome: Progressing  Goal: Signs and symptoms of infection will decrease  Outcome: Progressing     Problem: Knowledge Deficit - Standard  Goal: Patient and family/care givers will demonstrate understanding of plan of care, disease process/condition, diagnostic tests and medications  Outcome: Progressing

## 2021-06-17 NOTE — ED NOTES
Pt roomed, chart up for ERP.  PIV established, labs drawn and sent.  UA sent.  Pt asymptomatic at this time, reporting no pain.

## 2021-06-17 NOTE — ASSESSMENT & PLAN NOTE
Noted on CT A/P prior to admission  Due to Right renal pelvic and Right ureteral nephrolithiases  Urology consulted, underwent stone extraction 6/17

## 2021-06-17 NOTE — ED TRIAGE NOTES
"Chief Complaint   Patient presents with   • Flank Pain     Pt told by MD he has a kidney stone and told to come to ER to \"break it up\". Complains of flank pain and bloody urine        Pt amb to triage with steady gait for above complaint.   Pt is alert and oriented, speaking in full sentences, follows commands and responds appropriately to questions. Resp are even and unlabored. No obvious acute distress.    Pt placed in lobby. Pt educated on triage process. Pt encouraged to alert staff for any changes.    Vitals:    06/16/21 1923   BP: (!) 169/89   Pulse: 92   Resp: 14   Temp: 37.4 °C (99.4 °F)   SpO2: 95%       "

## 2021-06-18 VITALS
OXYGEN SATURATION: 91 % | TEMPERATURE: 99.2 F | RESPIRATION RATE: 18 BRPM | BODY MASS INDEX: 30.03 KG/M2 | WEIGHT: 191.36 LBS | HEIGHT: 67 IN | SYSTOLIC BLOOD PRESSURE: 175 MMHG | HEART RATE: 82 BPM | DIASTOLIC BLOOD PRESSURE: 88 MMHG

## 2021-06-18 PROBLEM — N13.2 HYDRONEPHROSIS WITH URINARY OBSTRUCTION DUE TO RENAL CALCULUS: Status: RESOLVED | Noted: 2021-06-16 | Resolved: 2021-06-18

## 2021-06-18 PROBLEM — N13.9 OBSTRUCTIVE UROPATHY: Status: RESOLVED | Noted: 2021-06-16 | Resolved: 2021-06-18

## 2021-06-18 PROBLEM — N12 PYELONEPHRITIS: Status: RESOLVED | Noted: 2021-06-16 | Resolved: 2021-06-18

## 2021-06-18 LAB
ANION GAP SERPL CALC-SCNC: 15 MMOL/L (ref 7–16)
BACTERIA UR CULT: NORMAL
BUN SERPL-MCNC: 23 MG/DL (ref 8–22)
CALCIUM SERPL-MCNC: 8.5 MG/DL (ref 8.5–10.5)
CHLORIDE SERPL-SCNC: 103 MMOL/L (ref 96–112)
CO2 SERPL-SCNC: 18 MMOL/L (ref 20–33)
CREAT SERPL-MCNC: 1.46 MG/DL (ref 0.5–1.4)
GLUCOSE SERPL-MCNC: 195 MG/DL (ref 65–99)
PATHOLOGY CONSULT NOTE: NORMAL
POTASSIUM SERPL-SCNC: 4.4 MMOL/L (ref 3.6–5.5)
SIGNIFICANT IND 70042: NORMAL
SITE SITE: NORMAL
SODIUM SERPL-SCNC: 136 MMOL/L (ref 135–145)
SOURCE SOURCE: NORMAL

## 2021-06-18 PROCEDURE — A9270 NON-COVERED ITEM OR SERVICE: HCPCS | Performed by: STUDENT IN AN ORGANIZED HEALTH CARE EDUCATION/TRAINING PROGRAM

## 2021-06-18 PROCEDURE — 80048 BASIC METABOLIC PNL TOTAL CA: CPT

## 2021-06-18 PROCEDURE — 700111 HCHG RX REV CODE 636 W/ 250 OVERRIDE (IP): Performed by: STUDENT IN AN ORGANIZED HEALTH CARE EDUCATION/TRAINING PROGRAM

## 2021-06-18 PROCEDURE — 97161 PT EVAL LOW COMPLEX 20 MIN: CPT

## 2021-06-18 PROCEDURE — 36415 COLL VENOUS BLD VENIPUNCTURE: CPT

## 2021-06-18 PROCEDURE — 700102 HCHG RX REV CODE 250 W/ 637 OVERRIDE(OP): Performed by: STUDENT IN AN ORGANIZED HEALTH CARE EDUCATION/TRAINING PROGRAM

## 2021-06-18 PROCEDURE — 99239 HOSP IP/OBS DSCHRG MGMT >30: CPT | Performed by: STUDENT IN AN ORGANIZED HEALTH CARE EDUCATION/TRAINING PROGRAM

## 2021-06-18 RX ORDER — METFORMIN HYDROCHLORIDE 500 MG/1
500 TABLET, EXTENDED RELEASE ORAL 2 TIMES DAILY
Qty: 60 TABLET | Refills: 0 | Status: SHIPPED | OUTPATIENT
Start: 2021-06-18

## 2021-06-18 RX ORDER — ACETAMINOPHEN 500 MG
1000 TABLET ORAL EVERY 8 HOURS PRN
COMMUNITY
Start: 2021-06-18

## 2021-06-18 RX ADMIN — OXYCODONE HYDROCHLORIDE 5 MG: 5 TABLET ORAL at 08:44

## 2021-06-18 RX ADMIN — OXYCODONE HYDROCHLORIDE 5 MG: 5 TABLET ORAL at 12:22

## 2021-06-18 RX ADMIN — HEPARIN SODIUM 5000 UNITS: 5000 INJECTION, SOLUTION INTRAVENOUS; SUBCUTANEOUS at 05:27

## 2021-06-18 RX ADMIN — DOCUSATE SODIUM 50 MG AND SENNOSIDES 8.6 MG 2 TABLET: 8.6; 5 TABLET, FILM COATED ORAL at 08:58

## 2021-06-18 RX ADMIN — CEFAZOLIN SODIUM 2 G: 2 INJECTION, SOLUTION INTRAVENOUS at 05:27

## 2021-06-18 ASSESSMENT — COGNITIVE AND FUNCTIONAL STATUS - GENERAL
SUGGESTED CMS G CODE MODIFIER MOBILITY: CH
MOBILITY SCORE: 24

## 2021-06-18 ASSESSMENT — PAIN DESCRIPTION - PAIN TYPE
TYPE: ACUTE PAIN

## 2021-06-18 ASSESSMENT — GAIT ASSESSMENTS
GAIT LEVEL OF ASSIST: SUPERVISED
DEVIATION: INCREASED BASE OF SUPPORT;TRENDELENBERG
DISTANCE (FEET): 400

## 2021-06-18 NOTE — ASSESSMENT & PLAN NOTE
History of HTN diagnosed by PCP but he opted against treatment  Follow up with PCP for initiation of antihypertensive based on ambulatory BP  ACEi may be indicated for CKD / microalbuminuria from T2DM

## 2021-06-18 NOTE — DISCHARGE INSTRUCTIONS
Discharge Instructions    Discharged to home by car with relative. Discharged via walking, hospital escort: Refused.  Special equipment needed: Not Applicable    Be sure to schedule a follow-up appointment with your primary care doctor or any specialists as instructed.     Discharge Plan:   Diet Plan: Discussed  Activity Level: Discussed  Confirmed Follow up Appointment: Patient to Call and Schedule Appointment  Confirmed Symptoms Management: Discussed  Medication Reconciliation Updated: Yes    I understand that a diet low in cholesterol, fat, and sodium is recommended for good health. Unless I have been given specific instructions below for another diet, I accept this instruction as my diet prescription.   Other diet: Regular    Special Instructions: None    · Is patient discharged on Warfarin / Coumadin?   No     Depression / Suicide Risk    As you are discharged from this [facility] facility, it is important to learn how to keep safe from harming yourself.    Recognize the warning signs:  · Abrupt changes in personality, positive or negative- including increase in energy   · Giving away possessions  · Change in eating patterns- significant weight changes-  positive or negative  · Change in sleeping patterns- unable to sleep or sleeping all the time   · Unwillingness or inability to communicate  · Depression  · Unusual sadness, discouragement and loneliness  · Talk of wanting to die  · Neglect of personal appearance   · Rebelliousness- reckless behavior  · Withdrawal from people/activities they love  · Confusion- inability to concentrate     If you or a loved one observes any of these behaviors or has concerns about self-harm, here's what you can do:  · Talk about it- your feelings and reasons for harming yourself  · Remove any means that you might use to hurt yourself (examples: pills, rope, extension cords, firearm)  · Get professional help from the community (Mental Health, Substance Abuse, psychological  counseling)  · Do not be alone:Call your Safe Contact- someone whom you trust who will be there for you.  · Call your local CRISIS HOTLINE 495-0547 or 902-456-3873  · Call your local Children's Mobile Crisis Response Team [place] (282) 057-0747 or www.Rift.io  · Call the toll free National Suicide Prevention Hotlines   · National Suicide Prevention Lifeline 864-066-MVZY (0098)  · The [employer] Hope Line Network 800-SUICIDE (669-0918)      Discharge Instructions per Raman Gloria M.D.    You were hospitalized for kidney stones (nephrolithiases) that were removed with a urology procedure (ureteroscopy). Your kidneys were malfunctioning due to the stones (obstructive acute kidney injury), which improved after removal of the stones.    During admission you were diagnosed with Type 2 Diabetes. Please follow up with a Primary Care Provider to manage diabetes.    DIET: Low-carbohydrate    ACTIVITY: No restrictions    DIAGNOSIS: Acute Kidney Injury due to Nephrolithiasis    Return to ER if you develop severe pain or faint for any reason.          Diabetes Mellitus and Nutrition, Adult  When you have diabetes (diabetes mellitus), it is very important to have healthy eating habits because your blood sugar (glucose) levels are greatly affected by what you eat and drink. Eating healthy foods in the appropriate amounts, at about the same times every day, can help you:  · Control your blood glucose.  · Lower your risk of heart disease.  · Improve your blood pressure.  · Reach or maintain a healthy weight.  Every person with diabetes is different, and each person has different needs for a meal plan. Your health care provider may recommend that you work with a diet and nutrition specialist (dietitian) to make a meal plan that is best for you. Your meal plan may vary depending on factors such as:  · The calories you need.  · The medicines you take.  · Your weight.  · Your blood glucose, blood pressure, and cholesterol  "levels.  · Your activity level.  · Other health conditions you have, such as heart or kidney disease.  How do carbohydrates affect me?  Carbohydrates, also called carbs, affect your blood glucose level more than any other type of food. Eating carbs naturally raises the amount of glucose in your blood. Carb counting is a method for keeping track of how many carbs you eat. Counting carbs is important to keep your blood glucose at a healthy level, especially if you use insulin or take certain oral diabetes medicines.  It is important to know how many carbs you can safely have in each meal. This is different for every person. Your dietitian can help you calculate how many carbs you should have at each meal and for each snack.  Foods that contain carbs include:  · Bread, cereal, rice, pasta, and crackers.  · Potatoes and corn.  · Peas, beans, and lentils.  · Milk and yogurt.  · Fruit and juice.  · Desserts, such as cakes, cookies, ice cream, and candy.  How does alcohol affect me?  Alcohol can cause a sudden decrease in blood glucose (hypoglycemia), especially if you use insulin or take certain oral diabetes medicines. Hypoglycemia can be a life-threatening condition. Symptoms of hypoglycemia (sleepiness, dizziness, and confusion) are similar to symptoms of having too much alcohol.  If your health care provider says that alcohol is safe for you, follow these guidelines:  · Limit alcohol intake to no more than 1 drink per day for nonpregnant women and 2 drinks per day for men. One drink equals 12 oz of beer, 5 oz of wine, or 1½ oz of hard liquor.  · Do not drink on an empty stomach.  · Keep yourself hydrated with water, diet soda, or unsweetened iced tea.  · Keep in mind that regular soda, juice, and other mixers may contain a lot of sugar and must be counted as carbs.  What are tips for following this plan?    Reading food labels  · Start by checking the serving size on the \"Nutrition Facts\" label of packaged foods and " "drinks. The amount of calories, carbs, fats, and other nutrients listed on the label is based on one serving of the item. Many items contain more than one serving per package.  · Check the total grams (g) of carbs in one serving. You can calculate the number of servings of carbs in one serving by dividing the total carbs by 15. For example, if a food has 30 g of total carbs, it would be equal to 2 servings of carbs.  · Check the number of grams (g) of saturated and trans fats in one serving. Choose foods that have low or no amount of these fats.  · Check the number of milligrams (mg) of salt (sodium) in one serving. Most people should limit total sodium intake to less than 2,300 mg per day.  · Always check the nutrition information of foods labeled as \"low-fat\" or \"nonfat\". These foods may be higher in added sugar or refined carbs and should be avoided.  · Talk to your dietitian to identify your daily goals for nutrients listed on the label.  Shopping  · Avoid buying canned, premade, or processed foods. These foods tend to be high in fat, sodium, and added sugar.  · Shop around the outside edge of the grocery store. This includes fresh fruits and vegetables, bulk grains, fresh meats, and fresh dairy.  Cooking  · Use low-heat cooking methods, such as baking, instead of high-heat cooking methods like deep frying.  · Cook using healthy oils, such as olive, canola, or sunflower oil.  · Avoid cooking with butter, cream, or high-fat meats.  Meal planning  · Eat meals and snacks regularly, preferably at the same times every day. Avoid going long periods of time without eating.  · Eat foods high in fiber, such as fresh fruits, vegetables, beans, and whole grains. Talk to your dietitian about how many servings of carbs you can eat at each meal.  · Eat 4-6 ounces (oz) of lean protein each day, such as lean meat, chicken, fish, eggs, or tofu. One oz of lean protein is equal to:  ? 1 oz of meat, chicken, or fish.  ? 1 egg.  ? ¼ " cup of tofu.  · Eat some foods each day that contain healthy fats, such as avocado, nuts, seeds, and fish.  Lifestyle  · Check your blood glucose regularly.  · Exercise regularly as told by your health care provider. This may include:  ? 150 minutes of moderate-intensity or vigorous-intensity exercise each week. This could be brisk walking, biking, or water aerobics.  ? Stretching and doing strength exercises, such as yoga or weightlifting, at least 2 times a week.  · Take medicines as told by your health care provider.  · Do not use any products that contain nicotine or tobacco, such as cigarettes and e-cigarettes. If you need help quitting, ask your health care provider.  · Work with a counselor or diabetes educator to identify strategies to manage stress and any emotional and social challenges.  Questions to ask a health care provider  · Do I need to meet with a diabetes educator?  · Do I need to meet with a dietitian?  · What number can I call if I have questions?  · When are the best times to check my blood glucose?  Where to find more information:  · American Diabetes Association: diabetes.org  · Academy of Nutrition and Dietetics: www.eatright.org  · National [place] of Diabetes and Digestive and Kidney Diseases (NIH): www.niddk.nih.gov  Summary  · A healthy meal plan will help you control your blood glucose and maintain a healthy lifestyle.  · Working with a diet and nutrition specialist (dietitian) can help you make a meal plan that is best for you.  · Keep in mind that carbohydrates (carbs) and alcohol have immediate effects on your blood glucose levels. It is important to count carbs and to use alcohol carefully.  This information is not intended to replace advice given to you by your health care provider. Make sure you discuss any questions you have with your health care provider.  Document Released: 09/14/2006 Document Revised: 11/30/2018 Document Reviewed: 01/22/2018  Elsevier Patient Education ©  2020 [employer]        Kidney Stones    Kidney stones (urolithiasis) are rock-like masses that form inside of the kidneys. Kidneys are organs that make pee (urine). A kidney stone can cause very bad pain and can block the flow of pee. The stone usually leaves your body (passes) through your pee. You may need to have a doctor take out the stone.  Follow these instructions at home:  Eating and drinking  · Drink enough fluid to keep your pee clear or pale yellow. This will help you pass the stone.  · If told by your doctor, change the foods you eat (your diet). This may include:  ? Limiting how much salt (sodium) you eat.  ? Eating more fruits and vegetables.  ? Limiting how much meat, poultry, fish, and eggs you eat.  · Follow instructions from your doctor about eating or drinking restrictions.  General instructions  · Collect pee samples as told by your doctor. You may need to collect a pee sample:  ? 24 hours after a stone comes out.  ? 8-12 weeks after a stone comes out, and every 6-12 months after that.  · Strain your pee every time you pee (urinate), for as long as told. Use the strainer that your doctor recommends.  · Do not throw out the stone. Keep it so that it can be tested by your doctor.  · Take over-the-counter and prescription medicines only as told by your doctor.  · Keep all follow-up visits as told by your doctor. This is important. You may need follow-up tests.  Preventing kidney stones  To prevent another kidney stone:  · Drink enough fluid to keep your pee clear or pale yellow. This is the best way to prevent kidney stones.  · Eat healthy foods.  · Avoid certain foods as told by your doctor. You may be told to eat less protein.  · Stay at a healthy weight.  Contact a doctor if:  · You have pain that gets worse or does not get better with medicine.  Get help right away if:  · You have a fever or chills.  · You get very bad pain.  · You get new pain in your belly (abdomen).  · You pass out  (faint).  · You cannot pee.  This information is not intended to replace advice given to you by your health care provider. Make sure you discuss any questions you have with your health care provider.  Document Released: 06/05/2009 Document Revised: 07/30/2019 Document Reviewed: 09/05/2017  Elsevier Patient Education © 2020 Elsevier Inc.

## 2021-06-18 NOTE — OR NURSING
Pt A&O4. VSS. Pt on 2 L of oxygen via silicone nasal cannula. Afebrile. Pt has no complaints of pain or nausea. Tolerated sips of water. R ureteral stent placed, string taped. Report called to JOSUÉ Dubois. Pt out of PACU with transport.

## 2021-06-18 NOTE — DISCHARGE SUMMARY
"Discharge Summary    CHIEF COMPLAINT ON ADMISSION  Chief Complaint   Patient presents with   • Flank Pain     Pt told by MD he has a kidney stone and told to come to ER to \"break it up\". Complains of flank pain and bloody urine        Reason for Admission  Right hydronephrosis from nephrolithiasis     Admission Date  6/16/2021    CODE STATUS  Full Code    HPI & HOSPITAL COURSE  This is a 81 y.o. male with HTN and T2DM referred here with Right flank pain and diagnosed hydronephrosis and obstructing nephrlithiases seen on outpatient CT. He presented with LORENE due to obstructive uropathy but had no signs of sepsis. Urology was consulted and he underwent ureteroscopy with extraction of the renal pelvis and ureteral stones. Renal function improved after extraction of nephrolithiases. He had minimal pain after procedure and declined further pain medication. He will follow up with urology for stone analysis and guided therapy.    During admission we discussed establishing care with a PCP. He expressed uncertainty about whether he had diabetes or hypertension in the past. His A1c this admission was 7.8 and he was counseled on establishing care with a PCP, weight loss, initiation of metformin, and follow up for determination of hypertension medication.    Therefore, he is discharged in good and stable condition to home with close outpatient follow-up.    The patient met 2-midnight criteria for an inpatient stay at the time of discharge.    Discharge Date  6/18/2021    FOLLOW UP ITEMS POST DISCHARGE  1. Nephrolithiasis - Urology follow up for stone analysis  2. Diabetes - low-dose metformin for tolerability, further management per PCP  3. 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  Urban Chung M.D.  [address] [facility]  [place] [address]  [phone]    Schedule an appointment as soon as possible for a visit in 1 week  Urology after-hospital follow up    Jessie Robbins P.A.-C.  [address]  [place] [id number] [phone]    Call in 1 week  to establish care with PCP for diabetes management      MEDICATIONS ON DISCHARGE     Medication List      START taking these medications      Instructions   acetaminophen 500 MG Tabs  Commonly known as: TYLENOL   Take 1-2 Tablets by mouth every 6 hours as needed for Mild Pain.  Dose: 500-1,000 mg            Allergies  No Known Allergies    DIET  Orders Placed This Encounter   Procedures   • Diet Order Diet: Regular     Standing Status:   Standing     Number of Occurrences:   1     Order Specific Question:   Diet:     Answer:   Regular [1]       ACTIVITY  As tolerated.  Weight bearing as tolerated    CONSULTATIONS  Urology    PROCEDURES  Ureteroscopy with stone extraction 6/17/21    LABORATORY  Lab Results   Component Value Date    SODIUM 136 06/18/2021    POTASSIUM 4.4 06/18/2021    CHLORIDE 103 06/18/2021    CO2 18 (L) 06/18/2021    GLUCOSE 195 (H) 06/18/2021    BUN 23 (H) 06/18/2021    CREATININE 1.46 (H) 06/18/2021        Lab Results   Component Value Date    WBC 4.8 06/17/2021    HEMOGLOBIN 13.8 (L) 06/17/2021    HEMATOCRIT 41.0 (L) 06/17/2021    PLATELETCT 96 (L) 06/17/2021        Total time of the discharge process exceeds 50 minutes.

## 2021-06-18 NOTE — CARE PLAN
Problem: Pain - Standard  Goal: Alleviation of pain or a reduction in pain to the patient’s comfort goal  Outcome: Progressing  Note: Pt has complaints of pain when getting out of bed to use the restroom and trying to void, otherwise pt has no other complaints of pain     Problem: Knowledge Deficit - Standard  Goal: Patient and family/care givers will demonstrate understanding of plan of care, disease process/condition, diagnostic tests and medications  Outcome: Progressing  Note: Pt is alert and oriented x 4; pt is aware and understands plan of care. All questions have been answered at this time, will continue to monitor.     The patient is Stable - Low risk of patient condition declining or worsening    Shift Goals  Clinical Goals:     Progress made toward(s) clinical / shift goals:  pain control    Patient is not progressing towards the following goals:n/a

## 2021-06-18 NOTE — PROGRESS NOTES
"Urology Progress Note    Post op Day # 1    Overnight Events: None    S: No fevers, chills, nausea or vomiting.  No complaints    O:   BP (!) 175/88   Pulse 82   Temp 37.3 °C (99.2 °F) (Temporal)   Resp 18   Ht 1.702 m (5' 7\")   Wt 86.8 kg (191 lb 5.8 oz)   SpO2 91%   Recent Labs     06/16/21 2035 06/17/21  0651 06/18/21  0037   SODIUM 140 137 136   POTASSIUM 4.0 4.2 4.4   CHLORIDE 107 106 103   CO2 22 24 18*   GLUCOSE 195* 138* 195*   BUN 27* 27* 23*   CREATININE 2.19* 2.17* 1.46*   CALCIUM 8.6 8.7 8.5     Recent Labs     06/16/21 2035 06/17/21  0651   WBC 5.8 4.8   RBC 4.61* 4.50*   HEMOGLOBIN 14.2 13.8*   HEMATOCRIT 40.9* 41.0*   MCV 88.7 91.1   MCH 30.8 30.7   MCHC 34.7 33.7   RDW 42.0 42.5   PLATELETCT 120* 96*   MPV 10.8 11.2         Intake/Output Summary (Last 24 hours) at 6/18/2021 1120  Last data filed at 6/18/2021 0842  Gross per 24 hour   Intake --   Output 935 ml   Net -935 ml       Exam:  Stent string protruding from urethra.  Stent removed at bedside, stent in tact.       A/P:    Active Hospital Problems    Diagnosis    • Overweight [E66.3]    • LORENE (acute kidney injury) ([facility]) [N17.9]    • Essential hypertension [I10]    • Thrombocytopenia ([facility]) [D69.6]    • Type 2 diabetes mellitus with diabetic nephropathy, without long-term current use of insulin ([facility]) [E11.21]        Stent removed at bedside.  Patient is cleared for discharge from urology standpoint.  "

## 2021-06-18 NOTE — CARE PLAN
The patient is Stable - Low risk of patient condition declining or worsening    Shift Goals  Clinical Goals: Recover from surgery  Patient Goals: Pain management    Progress made toward(s) clinical / shift goals: Patient is doing well, resting in bed. He reports pain being controlled with Low-dose oxycodone.    Problem: Pain - Standard  Goal: Alleviation of pain or a reduction in pain to the patient’s comfort goal  Outcome: Progressing     Problem: Physical Regulation  Goal: Signs and symptoms of infection will decrease  Outcome: Progressing     Problem: Knowledge Deficit - Standard  Goal: Patient and family/care givers will demonstrate understanding of plan of care, disease process/condition, diagnostic tests and medications  Outcome: Progressing

## 2021-06-18 NOTE — OP REPORT
DATE OF SERVICE:  06/17/2021     PREOPERATIVE DIAGNOSES:  1.  Right ureteral calculus.  2.  Right renal stone.  3.  Benign prostatic hypertrophy.  4.  Bladder diverticulum with bladder stones.     POSTOPERATIVE DIAGNOSES:  1.  Right ureteral calculus.  2.  Right renal stone.  3.  Benign prostatic hypertrophy.  4.  Bladder diverticulum with bladder stones.     PROCEDURES:    1.  Cystolitholapaxy of bladder calculi.  2.  Right ureteroscopy with manipulation of right ureteral calculus.  3.  Right renoscopy with laser lithotripsy of calculi.  4.  Basket stone extraction of stone fragments from the ureter and kidney.  5.  Placement of right ureteral stent, 4.8-S[place] x 22 cm on externalized   string.     SURGEON:  Urban Chung MD     ANESTHESIOLOGIST:  Antonio Urena MD     TYPE OF ANESTHESIA:  General.     INDICATIONS:  This 81-year-old male presented with an obstructing right   ureteral calculus and intractable pain.  He was taken to the operating room   for management of the stone.  He also had an incidentally noted nonobstructing   lower pole renal stones, which were managed at the same time.     DESCRIPTION OF PROCEDURE:  The patient was identified in the holding area.  He   was taken to the operative suite.  General anesthesia was administered by Dr. Urena.  Antibiotics were given intravenously.  He was placed in the dorsal   lithotomy position, and sterilely prepped and draped.  A timeout was called.    Correct patient and site of surgery was confirmed.  The 20-S[place] cystoscope   was advanced through the urethra under direct vision.  No urethral stricturing   was noted.  The prostate demonstrated trilobar hypertrophy.  The bladder was   heavily trabeculated with diverticula and multiple cellules.  In the left   lower bladder wall diverticulum, there were multiple punctate calculi, which   were irrigated through the sheath and extracted.  Numerous other stones, free   floating within the bladder were  similarly irrigated free and extracted.  The   right ureteral orifice was identified and cannulated with an open-ended   catheter.  Retrograde injection of contrast outlined the calculus at   approximately the L2-L3 interspace and a dilated collecting system above this.    Sensor wire was advanced into the kidney.     A ureteral access sheath system was used to dilate the distal ureter.  An   11/13x36 cm system was utilized.  Initially, the distal ureter was dilated   with the obturator.  I used the obturator to remove the sensor wire and   position an Amplatz Super Stiff wire into the kidney.  I then redilated with   obturator and sheath up to a portion just below the stone.  The Hunie flexible disposable scope was advanced and stone was approached in   the ureter.  This was pushed back into the kidney, manipulating the stone into   a lower pole richard. At that location, a 200 micron holmium laser fiber was   used to fragment and dust the stone and collect fragments and submit them as   renal stones.  A second stone located in another lower pole calyx was   identified, fragmented and extracted.  Irrigation of the kidney removed much   of the dust.  Clot had accumulated.  This was removed to make sure that there   was no large stone fragment.  I inspected all the calices and no significant   sized residual fragments were noted either visually or on fluoroscopy.  I then   positioned the sensor wire in the kidney and removed the scope and sheath in   tandem and saw no significant stone fragments in the kidney or ureter upon   withdrawal.  I backloaded the guidewire onto a cystoscope and advanced a   4.8-Italian x 22 cm double-J stent and coiled it in the kidney proximally and   the bladder distally.  I did leave a string attached that was taped to the   penis at the end of the procedure.     The bladder was again irrigated, rinsing free any stone debris and was left   minimally filled.  The patient was  then sent to recovery room in stable   condition.  He suffered no intraoperative complications.        ______________________________  MD CORY Zacarias/SUZANNA    DD:  06/17/2021 17:03  DT:  06/17/2021 18:12    Job#:  [id number]

## 2021-06-18 NOTE — THERAPY
Physical Therapy   Initial Evaluation     Patient Name: Jerry Whitfield  Age:  81 y.o., Sex:  male  Medical Record #: [id number]  Today's Date: 6/18/2021     Precautions: Other (See Comments), right ureteral stent     Assessment  Pt presents with impaired gait mechanics in setting of c/c of abdominal/flank pain, found to have kidney stone requiring ureteroscopy with nephrolithiasis extraction and right ureter stent placement; pt mobilizing without concern but only in room, no loss of balance with hallway ambulation; does have some gait deviations though appear to be at baseline; pt is very active at baseline, continues to work on cars and keeps his great granddaughter; discussed dehydration as it relates to reoccurrence of stones; no acute PT needs identified, appears functionally capable of dc home when medically appropriate to do so; please reconsult should condition change.      Plan    Recommend Physical Therapy for Evaluation only     DC Equipment Recommendations: None  Discharge Recommendations:  Anticipate that the patient will have no further physical therapy needs after discharge from the hospital       Abridged Subjective/Objective       06/18/21 1125   Prior Living Situation   Prior Services Home-Independent   Housing / Facility 1 Story House   Steps Into Home   (a few 1/2 inch steps)   Equipment Owned None   Lives with - Patient's Self Care Capacity Adult Children   Comments builds cars for hobbies; babysits his great granddaughter, Flavia; will have his son as needed during  recovery, lives across the street;    Prior Level of Functional Mobility   Bed Mobility Independent   Transfer Status Independent   Ambulation Independent   Distance Ambulation (Feet)   (to tolerance )   Assistive Devices Used None   Stairs Independent   History of Falls   History of Falls No   Cognition    Cognition / Consciousness WDL   Comments pleasant and cooperative; denies concern for dc home;    Passive ROM Lower Body    Passive ROM Lower Body WDL   Strength Lower Body   Lower Body Strength  WDL   Sensation Lower Body   Lower Extremity Sensation   WDL   Balance Assessment   Sitting Balance (Static) Good   Sitting Balance (Dynamic) Good   Standing Balance (Static) Good   Standing Balance (Dynamic) Fair +   Weight Shift Sitting Good   Weight Shift Standing Good   Comments no UE support in sitting/standing; no loss of balance in moving environment;    Gait Analysis   Gait Level Of Assist Supervised   Assistive Device None   Distance (Feet) 400   # of Times Distance was Traveled 1   Deviation Increased Base Of Support;Trendelenberg   Weight Bearing Status full   Vision Deficits Impacting Mobility denies   Comments distance limited by therapist; pt denies symptoms throughout;    Bed Mobility    Supine to Sit Modified Independent   Sit to Supine   (Nt, left wiht PA attending for stent removal)   Functional Mobility   Sit to Stand Supervised  (no AD)   Bed, Chair, Wheelchair Transfer Refused   Education Group   Role of Physical Therapist Patient Response Patient;Acceptance;Explanation;Demonstration;Verbal Demonstration;Action Demonstration   Additional Comments diet/exercise as it relates to dehydration/contribution to stones;

## 2021-06-18 NOTE — PROGRESS NOTES
[facility] Medicine Daily Progress Note    Date of Service  6/17/2021    Chief Complaint  81 y.o. male with T2DM admitted 6/16/2021 with Right nephrolithiases.    Hospital Course  No notes on file    Interval Problem Update  VEDA ON  He's had recurrent Right flank and RLQ pain that does not radiate to the testicle.  The pain comes and goes and is very intense.  He's had intermittent dark urine concerning for blood but denies dysuria.  He underwent CT yesterday and was directed to the ED.  He has not had any pain since admission.  He has a history of possible kidney stone but could not confirm after straining urine. He denies h/o gout.  He denies F/C, bowel dysfunction, emotional dysphoria, bleeding, leg swelling.    He reports that he has been told he has diabetes but attributes it to a mix-up of records with his son. He was also told in the past that he has HTN but did not return to that PCP because she did not address his other medical concerns. He does not have a PCP nor take any medications.    He underwent ureteroscopy with nephrolithiasis extraction and ureteral stent placement. Discussed POC with Urologist Dr. Chung, who will remove stent tomorrow.    Consultants/Specialty  Urology - nephrolithiasis.    Code Status  Full Code    Disposition  Home tomorrow    Review of Systems  Review of Systems   Constitutional: Negative for chills, fever and malaise/fatigue.   HENT: Negative for ear pain, nosebleeds, sinus pain and sore throat.    Eyes: Negative for pain.   Respiratory: Negative for hemoptysis and shortness of breath.    Cardiovascular: Negative for chest pain and leg swelling.   Gastrointestinal: Negative for abdominal pain, blood in stool, constipation, diarrhea, melena, nausea and vomiting.   Genitourinary: Positive for flank pain and hematuria. Negative for dysuria.   Musculoskeletal: Negative for back pain, joint pain, myalgias and neck pain.   Neurological: Negative for weakness and headaches.    Psychiatric/Behavioral: Negative for depression. The patient is not nervous/anxious.         Physical Exam  Temp:  [36.1 °C (97 °F)-37.4 °C (99.4 °F)] 36.4 °C (97.5 °F)  Pulse:  [68-92] 71  Resp:  [11-21] 15  BP: (129-193)/(58-94) 129/58  SpO2:  [93 %-99 %] 99 %    Physical Exam  Vitals and nursing note reviewed.   Constitutional:       General: He is not in acute distress.     Appearance: He is not ill-appearing.   HENT:      Head: Normocephalic.      Nose: Nose normal.      Mouth/Throat:      Mouth: Mucous membranes are moist.      Pharynx: Oropharynx is clear.   Eyes:      General: No scleral icterus.     Conjunctiva/sclera: Conjunctivae normal.   Cardiovascular:      Rate and Rhythm: Normal rate and regular rhythm.      Pulses: Normal pulses.      Heart sounds: Normal heart sounds. No murmur heard.   No friction rub. No gallop.    Pulmonary:      Effort: Pulmonary effort is normal. No respiratory distress.      Breath sounds: Normal breath sounds. No wheezing, rhonchi or rales.   Abdominal:      General: Abdomen is flat. Bowel sounds are normal. There is no distension.      Palpations: Abdomen is soft.      Tenderness: There is no abdominal tenderness. There is no right CVA tenderness, left CVA tenderness, guarding or rebound.   Genitourinary:     Comments: No thornton  Musculoskeletal:      Cervical back: Neck supple.      Right lower leg: No edema.      Left lower leg: No edema.   Skin:     General: Skin is warm and dry.   Neurological:      Mental Status: He is alert.      Comments: Appropriately conversant   Psychiatric:         Attention and Perception: Attention and perception normal.         Mood and Affect: Mood normal.         Speech: Speech is tangential (and circumferential).         Behavior: Behavior normal. Behavior is cooperative.         Thought Content: Thought content normal.         Cognition and Memory: Cognition and memory normal.         Judgment: Judgment normal.          Fluids    Intake/Output Summary (Last 24 hours) at 6/17/2021 1707  Last data filed at 6/17/2021 1556  Gross per 24 hour   Intake 0 ml   Output 310 ml   Net -310 ml       Laboratory  Recent Labs     06/16/21 2035 06/17/21  0651   WBC 5.8 4.8   RBC 4.61* 4.50*   HEMOGLOBIN 14.2 13.8*   HEMATOCRIT 40.9* 41.0*   MCV 88.7 91.1   MCH 30.8 30.7   MCHC 34.7 33.7   RDW 42.0 42.5   PLATELETCT 120* 96*   MPV 10.8 11.2     Recent Labs     06/16/21 2035 06/17/21  0651   SODIUM 140 137   POTASSIUM 4.0 4.2   CHLORIDE 107 106   CO2 22 24   GLUCOSE 195* 138*   BUN 27* 27*   CREATININE 2.19* 2.17*   CALCIUM 8.6 8.7                   Imaging  DX-CYSTO FLUORO > 1 HOUR    (Results Pending)        Assessment/Plan  * Obstructive uropathy- (present on admission)  Assessment & Plan  See plans for LORENE & Hydronephrosis due to urinary obstruction    Hydronephrosis with urinary obstruction due to renal calculus- (present on admission)  Assessment & Plan  Noted on CT A/P prior to admission  Due to Right renal pelvic and Right ureteral nephrolithiases  Urology consulted, underwent stone extraction 6/17    LORENE (acute kidney injury) (HCC)- (present on admission)  Assessment & Plan  Likely due to obstructive uropathy  Underwent ureteroscopy with stone extraction and stent 6/17  Repeat AM BMP    Type 2 diabetes mellitus with diabetic nephropathy, without long-term current use of insulin (HCC)- (present on admission)  Assessment & Plan  A1c 7.8  Not on any treatment PTA  Unknown baseline Cr, may have CKD from diabetic nephropathy  Microalbumin measurement after discharge when LORENE / nephrolithiasis resolves  Initiate metformin on discharge  Glycemic control mian-operative, discontinued SSI / POCT post-procedure    Pyelonephritis- (present on admission)  Assessment & Plan  Flank pain and UA +pyuria likely due to nephrolithiasis, no nitrites  Afebrile, no N/V, no dysuria  Urine culture NGTD, discontinue antibiotics after stent removal  tomorrow    Overweight- (present on admission)  Assessment & Plan  Outpatient weight loss for improved diabetic control    Essential hypertension- (present on admission)  Assessment & Plan  History of HTN diagnosed by PCP but he opted against treatment  Follow up with PCP for initiation of antihypertensive based on ambulatory BP  ACEi may be indicated for CKD / microalbuminuria from T2DM    Thrombocytopenia (HCC)- (present on admission)  Assessment & Plan  Chronic  Splenomegaly without signs of liver disease on CT A/P  Outpatient evaluation       VTE prophylaxis: SQH due to renal impairment

## 2021-06-19 LAB
BACTERIA UR CULT: NORMAL
SIGNIFICANT IND 70042: NORMAL
SITE SITE: NORMAL
SOURCE SOURCE: NORMAL

## 2021-06-20 ENCOUNTER — APPOINTMENT (OUTPATIENT)
Dept: RADIOLOGY | Facility: MEDICAL CENTER | Age: 81
DRG: 661 | End: 2021-06-20
Attending: EMERGENCY MEDICINE
Payer: MEDICARE

## 2021-06-20 ENCOUNTER — HOSPITAL ENCOUNTER (INPATIENT)
Facility: MEDICAL CENTER | Age: 81
LOS: 2 days | DRG: 661 | End: 2021-06-22
Attending: EMERGENCY MEDICINE | Admitting: STUDENT IN AN ORGANIZED HEALTH CARE EDUCATION/TRAINING PROGRAM
Payer: MEDICARE

## 2021-06-20 DIAGNOSIS — R10.9 ACUTE ABDOMINAL PAIN: ICD-10-CM

## 2021-06-20 DIAGNOSIS — R73.9 HYPERGLYCEMIA: ICD-10-CM

## 2021-06-20 DIAGNOSIS — I16.0 HYPERTENSIVE URGENCY: ICD-10-CM

## 2021-06-20 DIAGNOSIS — N20.1 URETEROLITHIASIS: ICD-10-CM

## 2021-06-20 DIAGNOSIS — I10 ESSENTIAL HYPERTENSION: ICD-10-CM

## 2021-06-20 DIAGNOSIS — E86.0 DEHYDRATION: ICD-10-CM

## 2021-06-20 DIAGNOSIS — E11.21 TYPE 2 DIABETES MELLITUS WITH DIABETIC NEPHROPATHY, WITHOUT LONG-TERM CURRENT USE OF INSULIN (HCC): ICD-10-CM

## 2021-06-20 PROBLEM — E78.5 HLD (HYPERLIPIDEMIA): Status: ACTIVE | Noted: 2017-09-21

## 2021-06-20 LAB
ALBUMIN SERPL BCP-MCNC: 4.6 G/DL (ref 3.2–4.9)
ALBUMIN/GLOB SERPL: 1.5 G/DL
ALP SERPL-CCNC: 93 U/L (ref 30–99)
ALT SERPL-CCNC: 15 U/L (ref 2–50)
ANION GAP SERPL CALC-SCNC: 16 MMOL/L (ref 7–16)
APPEARANCE UR: ABNORMAL
AST SERPL-CCNC: 34 U/L (ref 12–45)
BACTERIA #/AREA URNS HPF: NEGATIVE /HPF
BASOPHILS # BLD AUTO: 0.5 % (ref 0–1.8)
BASOPHILS # BLD: 0.03 K/UL (ref 0–0.12)
BILIRUB SERPL-MCNC: 0.7 MG/DL (ref 0.1–1.5)
BILIRUB UR QL STRIP.AUTO: NEGATIVE
BUN SERPL-MCNC: 25 MG/DL (ref 8–22)
CALCIUM SERPL-MCNC: 9.2 MG/DL (ref 8.5–10.5)
CHLORIDE SERPL-SCNC: 103 MMOL/L (ref 96–112)
CO2 SERPL-SCNC: 19 MMOL/L (ref 20–33)
COLOR UR: ABNORMAL
CREAT SERPL-MCNC: 1.48 MG/DL (ref 0.5–1.4)
EOSINOPHIL # BLD AUTO: 0.13 K/UL (ref 0–0.51)
EOSINOPHIL NFR BLD: 2 % (ref 0–6.9)
EPI CELLS #/AREA URNS HPF: NEGATIVE /HPF
ERYTHROCYTE [DISTWIDTH] IN BLOOD BY AUTOMATED COUNT: 41.3 FL (ref 35.9–50)
GLOBULIN SER CALC-MCNC: 3.1 G/DL (ref 1.9–3.5)
GLUCOSE SERPL-MCNC: 157 MG/DL (ref 65–99)
GLUCOSE UR STRIP.AUTO-MCNC: 100 MG/DL
HCT VFR BLD AUTO: 43.9 % (ref 42–52)
HGB BLD-MCNC: 15 G/DL (ref 14–18)
HYALINE CASTS #/AREA URNS LPF: ABNORMAL /LPF
IMM GRANULOCYTES # BLD AUTO: 0.05 K/UL (ref 0–0.11)
IMM GRANULOCYTES NFR BLD AUTO: 0.8 % (ref 0–0.9)
KETONES UR STRIP.AUTO-MCNC: 15 MG/DL
LACTATE BLD-SCNC: 1.3 MMOL/L (ref 0.5–2)
LEUKOCYTE ESTERASE UR QL STRIP.AUTO: ABNORMAL
LIPASE SERPL-CCNC: 21 U/L (ref 11–82)
LYMPHOCYTES # BLD AUTO: 1.33 K/UL (ref 1–4.8)
LYMPHOCYTES NFR BLD: 20.4 % (ref 22–41)
MCH RBC QN AUTO: 30.5 PG (ref 27–33)
MCHC RBC AUTO-ENTMCNC: 34.2 G/DL (ref 33.7–35.3)
MCV RBC AUTO: 89.2 FL (ref 81.4–97.8)
MICRO URNS: ABNORMAL
MONOCYTES # BLD AUTO: 0.42 K/UL (ref 0–0.85)
MONOCYTES NFR BLD AUTO: 6.4 % (ref 0–13.4)
NEUTROPHILS # BLD AUTO: 4.57 K/UL (ref 1.82–7.42)
NEUTROPHILS NFR BLD: 69.9 % (ref 44–72)
NITRITE UR QL STRIP.AUTO: NEGATIVE
NRBC # BLD AUTO: 0 K/UL
NRBC BLD-RTO: 0 /100 WBC
PH UR STRIP.AUTO: 5 [PH] (ref 5–8)
PLATELET # BLD AUTO: 140 K/UL (ref 164–446)
PMV BLD AUTO: 11 FL (ref 9–12.9)
POTASSIUM SERPL-SCNC: 3.9 MMOL/L (ref 3.6–5.5)
PROT SERPL-MCNC: 7.7 G/DL (ref 6–8.2)
PROT UR QL STRIP: 300 MG/DL
RBC # BLD AUTO: 4.92 M/UL (ref 4.7–6.1)
RBC # URNS HPF: >150 /HPF
RBC UR QL AUTO: ABNORMAL
SARS-COV+SARS-COV-2 AG RESP QL IA.RAPID: NOTDETECTED
SODIUM SERPL-SCNC: 138 MMOL/L (ref 135–145)
SP GR UR STRIP.AUTO: 1.02
SPECIMEN SOURCE: NORMAL
TROPONIN T SERPL-MCNC: 14 NG/L (ref 6–19)
UROBILINOGEN UR STRIP.AUTO-MCNC: 0.2 MG/DL
WBC # BLD AUTO: 6.5 K/UL (ref 4.8–10.8)
WBC #/AREA URNS HPF: ABNORMAL /HPF

## 2021-06-20 PROCEDURE — 83605 ASSAY OF LACTIC ACID: CPT

## 2021-06-20 PROCEDURE — 80053 COMPREHEN METABOLIC PANEL: CPT

## 2021-06-20 PROCEDURE — 770006 HCHG ROOM/CARE - MED/SURG/GYN SEMI*

## 2021-06-20 PROCEDURE — 87426 SARSCOV CORONAVIRUS AG IA: CPT

## 2021-06-20 PROCEDURE — 87086 URINE CULTURE/COLONY COUNT: CPT

## 2021-06-20 PROCEDURE — U0003 INFECTIOUS AGENT DETECTION BY NUCLEIC ACID (DNA OR RNA); SEVERE ACUTE RESPIRATORY SYNDROME CORONAVIRUS 2 (SARS-COV-2) (CORONAVIRUS DISEASE [COVID-19]), AMPLIFIED PROBE TECHNIQUE, MAKING USE OF HIGH THROUGHPUT TECHNOLOGIES AS DESCRIBED BY CMS-2020-01-R: HCPCS

## 2021-06-20 PROCEDURE — 96375 TX/PRO/DX INJ NEW DRUG ADDON: CPT

## 2021-06-20 PROCEDURE — 84484 ASSAY OF TROPONIN QUANT: CPT

## 2021-06-20 PROCEDURE — 96374 THER/PROPH/DIAG INJ IV PUSH: CPT

## 2021-06-20 PROCEDURE — 74176 CT ABD & PELVIS W/O CONTRAST: CPT | Mod: ME

## 2021-06-20 PROCEDURE — U0005 INFEC AGEN DETEC AMPLI PROBE: HCPCS

## 2021-06-20 PROCEDURE — 99497 ADVNCD CARE PLAN 30 MIN: CPT | Performed by: STUDENT IN AN ORGANIZED HEALTH CARE EDUCATION/TRAINING PROGRAM

## 2021-06-20 PROCEDURE — 93005 ELECTROCARDIOGRAM TRACING: CPT | Performed by: EMERGENCY MEDICINE

## 2021-06-20 PROCEDURE — C9803 HOPD COVID-19 SPEC COLLECT: HCPCS | Performed by: EMERGENCY MEDICINE

## 2021-06-20 PROCEDURE — 99285 EMERGENCY DEPT VISIT HI MDM: CPT

## 2021-06-20 PROCEDURE — 71045 X-RAY EXAM CHEST 1 VIEW: CPT

## 2021-06-20 PROCEDURE — 700105 HCHG RX REV CODE 258: Performed by: EMERGENCY MEDICINE

## 2021-06-20 PROCEDURE — 85025 COMPLETE CBC W/AUTO DIFF WBC: CPT

## 2021-06-20 PROCEDURE — 83690 ASSAY OF LIPASE: CPT

## 2021-06-20 PROCEDURE — 99221 1ST HOSP IP/OBS SF/LOW 40: CPT | Mod: AI,25 | Performed by: STUDENT IN AN ORGANIZED HEALTH CARE EDUCATION/TRAINING PROGRAM

## 2021-06-20 PROCEDURE — 700111 HCHG RX REV CODE 636 W/ 250 OVERRIDE (IP): Performed by: EMERGENCY MEDICINE

## 2021-06-20 PROCEDURE — 81001 URINALYSIS AUTO W/SCOPE: CPT

## 2021-06-20 RX ORDER — HYDRALAZINE HYDROCHLORIDE 25 MG/1
25 TABLET, FILM COATED ORAL EVERY 8 HOURS
Status: DISCONTINUED | OUTPATIENT
Start: 2021-06-21 | End: 2021-06-22 | Stop reason: HOSPADM

## 2021-06-20 RX ORDER — AMOXICILLIN 250 MG
2 CAPSULE ORAL 2 TIMES DAILY
Status: DISCONTINUED | OUTPATIENT
Start: 2021-06-21 | End: 2021-06-22 | Stop reason: HOSPADM

## 2021-06-20 RX ORDER — ONDANSETRON 2 MG/ML
4 INJECTION INTRAMUSCULAR; INTRAVENOUS ONCE
Status: COMPLETED | OUTPATIENT
Start: 2021-06-20 | End: 2021-06-20

## 2021-06-20 RX ORDER — LABETALOL HYDROCHLORIDE 5 MG/ML
10 INJECTION, SOLUTION INTRAVENOUS EVERY 4 HOURS PRN
Status: DISCONTINUED | OUTPATIENT
Start: 2021-06-20 | End: 2021-06-22 | Stop reason: HOSPADM

## 2021-06-20 RX ORDER — ACETAMINOPHEN 325 MG/1
650 TABLET ORAL EVERY 6 HOURS PRN
Status: DISCONTINUED | OUTPATIENT
Start: 2021-06-20 | End: 2021-06-22 | Stop reason: HOSPADM

## 2021-06-20 RX ORDER — BISACODYL 10 MG
10 SUPPOSITORY, RECTAL RECTAL
Status: DISCONTINUED | OUTPATIENT
Start: 2021-06-20 | End: 2021-06-22 | Stop reason: HOSPADM

## 2021-06-20 RX ORDER — SODIUM CHLORIDE, SODIUM LACTATE, POTASSIUM CHLORIDE, CALCIUM CHLORIDE 600; 310; 30; 20 MG/100ML; MG/100ML; MG/100ML; MG/100ML
1000 INJECTION, SOLUTION INTRAVENOUS ONCE
Status: COMPLETED | OUTPATIENT
Start: 2021-06-20 | End: 2021-06-20

## 2021-06-20 RX ORDER — CEFAZOLIN SODIUM 2 G/100ML
2 INJECTION, SOLUTION INTRAVENOUS EVERY 8 HOURS
Status: DISCONTINUED | OUTPATIENT
Start: 2021-06-21 | End: 2021-06-22 | Stop reason: HOSPADM

## 2021-06-20 RX ORDER — ONDANSETRON 2 MG/ML
4 INJECTION INTRAMUSCULAR; INTRAVENOUS EVERY 4 HOURS PRN
Status: DISCONTINUED | OUTPATIENT
Start: 2021-06-20 | End: 2021-06-22 | Stop reason: HOSPADM

## 2021-06-20 RX ORDER — AMLODIPINE BESYLATE 10 MG/1
10 TABLET ORAL
Status: DISCONTINUED | OUTPATIENT
Start: 2021-06-21 | End: 2021-06-22 | Stop reason: HOSPADM

## 2021-06-20 RX ORDER — SODIUM CHLORIDE, SODIUM LACTATE, POTASSIUM CHLORIDE, AND CALCIUM CHLORIDE .6; .31; .03; .02 G/100ML; G/100ML; G/100ML; G/100ML
500 INJECTION, SOLUTION INTRAVENOUS
Status: DISCONTINUED | OUTPATIENT
Start: 2021-06-20 | End: 2021-06-22 | Stop reason: HOSPADM

## 2021-06-20 RX ORDER — ATORVASTATIN CALCIUM 40 MG/1
40 TABLET, FILM COATED ORAL EVERY EVENING
Status: DISCONTINUED | OUTPATIENT
Start: 2021-06-21 | End: 2021-06-22 | Stop reason: HOSPADM

## 2021-06-20 RX ORDER — OXYCODONE HYDROCHLORIDE 5 MG/1
5 TABLET ORAL
Status: DISCONTINUED | OUTPATIENT
Start: 2021-06-20 | End: 2021-06-22 | Stop reason: HOSPADM

## 2021-06-20 RX ORDER — POLYETHYLENE GLYCOL 3350 17 G/17G
1 POWDER, FOR SOLUTION ORAL
Status: DISCONTINUED | OUTPATIENT
Start: 2021-06-20 | End: 2021-06-22 | Stop reason: HOSPADM

## 2021-06-20 RX ORDER — HYDROMORPHONE HYDROCHLORIDE 1 MG/ML
1 INJECTION, SOLUTION INTRAMUSCULAR; INTRAVENOUS; SUBCUTANEOUS ONCE
Status: COMPLETED | OUTPATIENT
Start: 2021-06-20 | End: 2021-06-20

## 2021-06-20 RX ORDER — HEPARIN SODIUM 5000 [USP'U]/ML
5000 INJECTION, SOLUTION INTRAVENOUS; SUBCUTANEOUS EVERY 8 HOURS
Status: DISCONTINUED | OUTPATIENT
Start: 2021-06-21 | End: 2021-06-22 | Stop reason: HOSPADM

## 2021-06-20 RX ORDER — ONDANSETRON 4 MG/1
4 TABLET, ORALLY DISINTEGRATING ORAL EVERY 4 HOURS PRN
Status: DISCONTINUED | OUTPATIENT
Start: 2021-06-20 | End: 2021-06-22 | Stop reason: HOSPADM

## 2021-06-20 RX ORDER — SODIUM CHLORIDE, SODIUM LACTATE, POTASSIUM CHLORIDE, CALCIUM CHLORIDE 600; 310; 30; 20 MG/100ML; MG/100ML; MG/100ML; MG/100ML
2000 INJECTION, SOLUTION INTRAVENOUS CONTINUOUS
Status: DISCONTINUED | OUTPATIENT
Start: 2021-06-21 | End: 2021-06-21

## 2021-06-20 RX ORDER — OXYCODONE HYDROCHLORIDE 5 MG/1
2.5 TABLET ORAL
Status: DISCONTINUED | OUTPATIENT
Start: 2021-06-20 | End: 2021-06-22 | Stop reason: HOSPADM

## 2021-06-20 RX ORDER — ENALAPRILAT 1.25 MG/ML
1.25 INJECTION INTRAVENOUS EVERY 6 HOURS PRN
Status: DISCONTINUED | OUTPATIENT
Start: 2021-06-20 | End: 2021-06-20

## 2021-06-20 RX ORDER — GUAIFENESIN/DEXTROMETHORPHAN 100-10MG/5
10 SYRUP ORAL EVERY 6 HOURS PRN
Status: DISCONTINUED | OUTPATIENT
Start: 2021-06-20 | End: 2021-06-22 | Stop reason: HOSPADM

## 2021-06-20 RX ORDER — DEXTROSE MONOHYDRATE 25 G/50ML
50 INJECTION, SOLUTION INTRAVENOUS
Status: DISCONTINUED | OUTPATIENT
Start: 2021-06-20 | End: 2021-06-22 | Stop reason: HOSPADM

## 2021-06-20 RX ORDER — MORPHINE SULFATE 4 MG/ML
2 INJECTION, SOLUTION INTRAMUSCULAR; INTRAVENOUS
Status: DISCONTINUED | OUTPATIENT
Start: 2021-06-20 | End: 2021-06-22 | Stop reason: HOSPADM

## 2021-06-20 RX ADMIN — ONDANSETRON 4 MG: 2 INJECTION INTRAMUSCULAR; INTRAVENOUS at 20:49

## 2021-06-20 RX ADMIN — SODIUM CHLORIDE, POTASSIUM CHLORIDE, SODIUM LACTATE AND CALCIUM CHLORIDE 1000 ML: 600; 310; 30; 20 INJECTION, SOLUTION INTRAVENOUS at 20:50

## 2021-06-20 RX ADMIN — HYDROMORPHONE HYDROCHLORIDE 1 MG: 1 INJECTION, SOLUTION INTRAMUSCULAR; INTRAVENOUS; SUBCUTANEOUS at 20:49

## 2021-06-20 ASSESSMENT — FIBROSIS 4 INDEX: FIB4 SCORE: 4.23

## 2021-06-21 ENCOUNTER — ANESTHESIA (OUTPATIENT)
Dept: SURGERY | Facility: MEDICAL CENTER | Age: 81
DRG: 661 | End: 2021-06-21
Payer: MEDICARE

## 2021-06-21 ENCOUNTER — APPOINTMENT (OUTPATIENT)
Dept: RADIOLOGY | Facility: MEDICAL CENTER | Age: 81
DRG: 661 | End: 2021-06-21
Attending: UROLOGY
Payer: MEDICARE

## 2021-06-21 ENCOUNTER — ANESTHESIA EVENT (OUTPATIENT)
Dept: SURGERY | Facility: MEDICAL CENTER | Age: 81
DRG: 661 | End: 2021-06-21
Payer: MEDICARE

## 2021-06-21 PROBLEM — Z71.89 ACP (ADVANCE CARE PLANNING): Status: ACTIVE | Noted: 2021-06-21

## 2021-06-21 PROBLEM — E86.0 DEHYDRATION: Status: ACTIVE | Noted: 2021-06-21

## 2021-06-21 LAB
ALBUMIN SERPL BCP-MCNC: 3.5 G/DL (ref 3.2–4.9)
BASOPHILS # BLD AUTO: 0.5 % (ref 0–1.8)
BASOPHILS # BLD: 0.03 K/UL (ref 0–0.12)
BUN SERPL-MCNC: 25 MG/DL (ref 8–22)
CALCIUM SERPL-MCNC: 8.9 MG/DL (ref 8.5–10.5)
CHLORIDE SERPL-SCNC: 104 MMOL/L (ref 96–112)
CO2 SERPL-SCNC: 21 MMOL/L (ref 20–33)
CREAT SERPL-MCNC: 1.47 MG/DL (ref 0.5–1.4)
EKG IMPRESSION: NORMAL
EOSINOPHIL # BLD AUTO: 0.09 K/UL (ref 0–0.51)
EOSINOPHIL NFR BLD: 1.4 % (ref 0–6.9)
ERYTHROCYTE [DISTWIDTH] IN BLOOD BY AUTOMATED COUNT: 43.2 FL (ref 35.9–50)
GLUCOSE BLD-MCNC: 113 MG/DL (ref 65–99)
GLUCOSE BLD-MCNC: 118 MG/DL (ref 65–99)
GLUCOSE BLD-MCNC: 125 MG/DL (ref 65–99)
GLUCOSE BLD-MCNC: 189 MG/DL (ref 65–99)
GLUCOSE SERPL-MCNC: 137 MG/DL (ref 65–99)
HCT VFR BLD AUTO: 41.4 % (ref 42–52)
HGB BLD-MCNC: 13.6 G/DL (ref 14–18)
IMM GRANULOCYTES # BLD AUTO: 0.03 K/UL (ref 0–0.11)
IMM GRANULOCYTES NFR BLD AUTO: 0.5 % (ref 0–0.9)
LYMPHOCYTES # BLD AUTO: 1.41 K/UL (ref 1–4.8)
LYMPHOCYTES NFR BLD: 22.4 % (ref 22–41)
MAGNESIUM SERPL-MCNC: 2.2 MG/DL (ref 1.5–2.5)
MCH RBC QN AUTO: 30.4 PG (ref 27–33)
MCHC RBC AUTO-ENTMCNC: 32.9 G/DL (ref 33.7–35.3)
MCV RBC AUTO: 92.6 FL (ref 81.4–97.8)
MONOCYTES # BLD AUTO: 0.49 K/UL (ref 0–0.85)
MONOCYTES NFR BLD AUTO: 7.8 % (ref 0–13.4)
NEUTROPHILS # BLD AUTO: 4.24 K/UL (ref 1.82–7.42)
NEUTROPHILS NFR BLD: 67.4 % (ref 44–72)
NRBC # BLD AUTO: 0 K/UL
NRBC BLD-RTO: 0 /100 WBC
PATHOLOGY CONSULT NOTE: NORMAL
PHOSPHATE SERPL-MCNC: 3.2 MG/DL (ref 2.5–4.5)
PLATELET # BLD AUTO: 110 K/UL (ref 164–446)
PMV BLD AUTO: 10.7 FL (ref 9–12.9)
POTASSIUM SERPL-SCNC: 4.1 MMOL/L (ref 3.6–5.5)
RBC # BLD AUTO: 4.47 M/UL (ref 4.7–6.1)
SARS-COV-2 RNA RESP QL NAA+PROBE: NOTDETECTED
SODIUM SERPL-SCNC: 134 MMOL/L (ref 135–145)
SPECIMEN SOURCE: NORMAL
WBC # BLD AUTO: 6.3 K/UL (ref 4.8–10.8)

## 2021-06-21 PROCEDURE — A9270 NON-COVERED ITEM OR SERVICE: HCPCS | Performed by: UROLOGY

## 2021-06-21 PROCEDURE — 0T9B70Z DRAINAGE OF BLADDER WITH DRAINAGE DEVICE, VIA NATURAL OR ARTIFICIAL OPENING: ICD-10-PCS | Performed by: UROLOGY

## 2021-06-21 PROCEDURE — 160028 HCHG SURGERY MINUTES - 1ST 30 MINS LEVEL 3: Performed by: UROLOGY

## 2021-06-21 PROCEDURE — 0TC68ZZ EXTIRPATION OF MATTER FROM RIGHT URETER, VIA NATURAL OR ARTIFICIAL OPENING ENDOSCOPIC: ICD-10-PCS | Performed by: UROLOGY

## 2021-06-21 PROCEDURE — 700111 HCHG RX REV CODE 636 W/ 250 OVERRIDE (IP): Performed by: STUDENT IN AN ORGANIZED HEALTH CARE EDUCATION/TRAINING PROGRAM

## 2021-06-21 PROCEDURE — 700117 HCHG RX CONTRAST REV CODE 255: Performed by: UROLOGY

## 2021-06-21 PROCEDURE — 700101 HCHG RX REV CODE 250: Performed by: ANESTHESIOLOGY

## 2021-06-21 PROCEDURE — 160035 HCHG PACU - 1ST 60 MINS PHASE I: Performed by: UROLOGY

## 2021-06-21 PROCEDURE — 85025 COMPLETE CBC W/AUTO DIFF WBC: CPT

## 2021-06-21 PROCEDURE — A9270 NON-COVERED ITEM OR SERVICE: HCPCS | Performed by: STUDENT IN AN ORGANIZED HEALTH CARE EDUCATION/TRAINING PROGRAM

## 2021-06-21 PROCEDURE — 160036 HCHG PACU - EA ADDL 30 MINS PHASE I: Performed by: UROLOGY

## 2021-06-21 PROCEDURE — C1758 CATHETER, URETERAL: HCPCS | Performed by: UROLOGY

## 2021-06-21 PROCEDURE — 160009 HCHG ANES TIME/MIN: Performed by: UROLOGY

## 2021-06-21 PROCEDURE — BT1DZZZ FLUOROSCOPY OF RIGHT KIDNEY, URETER AND BLADDER: ICD-10-PCS | Performed by: UROLOGY

## 2021-06-21 PROCEDURE — 700102 HCHG RX REV CODE 250 W/ 637 OVERRIDE(OP): Performed by: STUDENT IN AN ORGANIZED HEALTH CARE EDUCATION/TRAINING PROGRAM

## 2021-06-21 PROCEDURE — 700105 HCHG RX REV CODE 258: Performed by: ANESTHESIOLOGY

## 2021-06-21 PROCEDURE — 160039 HCHG SURGERY MINUTES - EA ADDL 1 MIN LEVEL 3: Performed by: UROLOGY

## 2021-06-21 PROCEDURE — 770006 HCHG ROOM/CARE - MED/SURG/GYN SEMI*

## 2021-06-21 PROCEDURE — 83735 ASSAY OF MAGNESIUM: CPT

## 2021-06-21 PROCEDURE — 700105 HCHG RX REV CODE 258: Performed by: STUDENT IN AN ORGANIZED HEALTH CARE EDUCATION/TRAINING PROGRAM

## 2021-06-21 PROCEDURE — 160048 HCHG OR STATISTICAL LEVEL 1-5: Performed by: UROLOGY

## 2021-06-21 PROCEDURE — 0T768DZ DILATION OF RIGHT URETER WITH INTRALUMINAL DEVICE, VIA NATURAL OR ARTIFICIAL OPENING ENDOSCOPIC: ICD-10-PCS | Performed by: UROLOGY

## 2021-06-21 PROCEDURE — 160002 HCHG RECOVERY MINUTES (STAT): Performed by: UROLOGY

## 2021-06-21 PROCEDURE — 700102 HCHG RX REV CODE 250 W/ 637 OVERRIDE(OP): Performed by: UROLOGY

## 2021-06-21 PROCEDURE — 82962 GLUCOSE BLOOD TEST: CPT | Mod: 91

## 2021-06-21 PROCEDURE — 99233 SBSQ HOSP IP/OBS HIGH 50: CPT | Performed by: INTERNAL MEDICINE

## 2021-06-21 PROCEDURE — C2617 STENT, NON-COR, TEM W/O DEL: HCPCS | Performed by: UROLOGY

## 2021-06-21 PROCEDURE — 3E1K88Z IRRIGATION OF GENITOURINARY TRACT USING IRRIGATING SUBSTANCE, VIA NATURAL OR ARTIFICIAL OPENING ENDOSCOPIC: ICD-10-PCS | Performed by: UROLOGY

## 2021-06-21 PROCEDURE — 88300 SURGICAL PATH GROSS: CPT

## 2021-06-21 PROCEDURE — 36415 COLL VENOUS BLD VENIPUNCTURE: CPT

## 2021-06-21 PROCEDURE — 80069 RENAL FUNCTION PANEL: CPT

## 2021-06-21 PROCEDURE — 82365 CALCULUS SPECTROSCOPY: CPT

## 2021-06-21 PROCEDURE — C1769 GUIDE WIRE: HCPCS | Performed by: UROLOGY

## 2021-06-21 PROCEDURE — 700111 HCHG RX REV CODE 636 W/ 250 OVERRIDE (IP): Performed by: ANESTHESIOLOGY

## 2021-06-21 DEVICE — STENT UROLOGICAL POLARIS 6X26  ULTRA: Type: IMPLANTABLE DEVICE | Site: URETER | Status: FUNCTIONAL

## 2021-06-21 RX ORDER — DIPHENHYDRAMINE HYDROCHLORIDE 50 MG/ML
12.5 INJECTION INTRAMUSCULAR; INTRAVENOUS
Status: DISCONTINUED | OUTPATIENT
Start: 2021-06-21 | End: 2021-06-21 | Stop reason: HOSPADM

## 2021-06-21 RX ORDER — OXYCODONE HCL 5 MG/5 ML
5 SOLUTION, ORAL ORAL
Status: DISCONTINUED | OUTPATIENT
Start: 2021-06-21 | End: 2021-06-21 | Stop reason: HOSPADM

## 2021-06-21 RX ORDER — HALOPERIDOL 5 MG/ML
1 INJECTION INTRAMUSCULAR
Status: DISCONTINUED | OUTPATIENT
Start: 2021-06-21 | End: 2021-06-21 | Stop reason: HOSPADM

## 2021-06-21 RX ORDER — HYDRALAZINE HYDROCHLORIDE 20 MG/ML
5 INJECTION INTRAMUSCULAR; INTRAVENOUS
Status: DISCONTINUED | OUTPATIENT
Start: 2021-06-21 | End: 2021-06-21 | Stop reason: HOSPADM

## 2021-06-21 RX ORDER — SODIUM CHLORIDE, SODIUM LACTATE, POTASSIUM CHLORIDE, CALCIUM CHLORIDE 600; 310; 30; 20 MG/100ML; MG/100ML; MG/100ML; MG/100ML
INJECTION, SOLUTION INTRAVENOUS
Status: DISCONTINUED | OUTPATIENT
Start: 2021-06-21 | End: 2021-06-21 | Stop reason: SURG

## 2021-06-21 RX ORDER — OXYCODONE HCL 5 MG/5 ML
10 SOLUTION, ORAL ORAL
Status: DISCONTINUED | OUTPATIENT
Start: 2021-06-21 | End: 2021-06-21 | Stop reason: HOSPADM

## 2021-06-21 RX ORDER — MEPERIDINE HYDROCHLORIDE 25 MG/ML
12.5 INJECTION INTRAMUSCULAR; INTRAVENOUS; SUBCUTANEOUS
Status: DISCONTINUED | OUTPATIENT
Start: 2021-06-21 | End: 2021-06-21 | Stop reason: HOSPADM

## 2021-06-21 RX ORDER — MIDAZOLAM HYDROCHLORIDE 1 MG/ML
1 INJECTION INTRAMUSCULAR; INTRAVENOUS
Status: DISCONTINUED | OUTPATIENT
Start: 2021-06-21 | End: 2021-06-21 | Stop reason: HOSPADM

## 2021-06-21 RX ORDER — SODIUM CHLORIDE, SODIUM LACTATE, POTASSIUM CHLORIDE, CALCIUM CHLORIDE 600; 310; 30; 20 MG/100ML; MG/100ML; MG/100ML; MG/100ML
INJECTION, SOLUTION INTRAVENOUS CONTINUOUS
Status: DISCONTINUED | OUTPATIENT
Start: 2021-06-21 | End: 2021-06-21 | Stop reason: HOSPADM

## 2021-06-21 RX ORDER — KETOROLAC TROMETHAMINE 30 MG/ML
INJECTION, SOLUTION INTRAMUSCULAR; INTRAVENOUS PRN
Status: DISCONTINUED | OUTPATIENT
Start: 2021-06-21 | End: 2021-06-21 | Stop reason: SURG

## 2021-06-21 RX ORDER — ATROPA BELLADONNA AND OPIUM 16.2; 3 MG/1; MG/1
1 SUPPOSITORY RECTAL
Status: COMPLETED | OUTPATIENT
Start: 2021-06-21 | End: 2021-06-21

## 2021-06-21 RX ORDER — SODIUM CHLORIDE 9 MG/ML
INJECTION, SOLUTION INTRAVENOUS
Status: DISCONTINUED | OUTPATIENT
Start: 2021-06-21 | End: 2021-06-21 | Stop reason: SURG

## 2021-06-21 RX ORDER — HYDROMORPHONE HYDROCHLORIDE 1 MG/ML
1 INJECTION, SOLUTION INTRAMUSCULAR; INTRAVENOUS; SUBCUTANEOUS
Status: DISCONTINUED | OUTPATIENT
Start: 2021-06-21 | End: 2021-06-22 | Stop reason: HOSPADM

## 2021-06-21 RX ORDER — LIDOCAINE HYDROCHLORIDE 20 MG/ML
INJECTION, SOLUTION EPIDURAL; INFILTRATION; INTRACAUDAL; PERINEURAL PRN
Status: DISCONTINUED | OUTPATIENT
Start: 2021-06-21 | End: 2021-06-21 | Stop reason: SURG

## 2021-06-21 RX ORDER — HYDROMORPHONE HYDROCHLORIDE 1 MG/ML
0.1 INJECTION, SOLUTION INTRAMUSCULAR; INTRAVENOUS; SUBCUTANEOUS
Status: DISCONTINUED | OUTPATIENT
Start: 2021-06-21 | End: 2021-06-21 | Stop reason: HOSPADM

## 2021-06-21 RX ORDER — IPRATROPIUM BROMIDE AND ALBUTEROL SULFATE 2.5; .5 MG/3ML; MG/3ML
3 SOLUTION RESPIRATORY (INHALATION)
Status: DISCONTINUED | OUTPATIENT
Start: 2021-06-21 | End: 2021-06-21 | Stop reason: HOSPADM

## 2021-06-21 RX ORDER — LABETALOL HYDROCHLORIDE 5 MG/ML
5 INJECTION, SOLUTION INTRAVENOUS
Status: DISCONTINUED | OUTPATIENT
Start: 2021-06-21 | End: 2021-06-21 | Stop reason: HOSPADM

## 2021-06-21 RX ORDER — HYDROMORPHONE HYDROCHLORIDE 1 MG/ML
0.2 INJECTION, SOLUTION INTRAMUSCULAR; INTRAVENOUS; SUBCUTANEOUS
Status: DISCONTINUED | OUTPATIENT
Start: 2021-06-21 | End: 2021-06-21 | Stop reason: HOSPADM

## 2021-06-21 RX ORDER — ONDANSETRON 2 MG/ML
4 INJECTION INTRAMUSCULAR; INTRAVENOUS
Status: DISCONTINUED | OUTPATIENT
Start: 2021-06-21 | End: 2021-06-21 | Stop reason: HOSPADM

## 2021-06-21 RX ORDER — CHOLECALCIFEROL (VITAMIN D3) 125 MCG
1000 CAPSULE ORAL DAILY
Status: DISCONTINUED | OUTPATIENT
Start: 2021-06-21 | End: 2021-06-22 | Stop reason: HOSPADM

## 2021-06-21 RX ORDER — HYDROMORPHONE HYDROCHLORIDE 1 MG/ML
0.4 INJECTION, SOLUTION INTRAMUSCULAR; INTRAVENOUS; SUBCUTANEOUS
Status: DISCONTINUED | OUTPATIENT
Start: 2021-06-21 | End: 2021-06-21 | Stop reason: HOSPADM

## 2021-06-21 RX ORDER — METOPROLOL TARTRATE 1 MG/ML
1 INJECTION, SOLUTION INTRAVENOUS
Status: DISCONTINUED | OUTPATIENT
Start: 2021-06-21 | End: 2021-06-21 | Stop reason: HOSPADM

## 2021-06-21 RX ADMIN — CEFAZOLIN SODIUM 2 G: 2 INJECTION, SOLUTION INTRAVENOUS at 01:31

## 2021-06-21 RX ADMIN — LIDOCAINE HYDROCHLORIDE 70 MG: 20 INJECTION, SOLUTION EPIDURAL; INFILTRATION; INTRACAUDAL at 14:31

## 2021-06-21 RX ADMIN — HYDRALAZINE HYDROCHLORIDE 25 MG: 25 TABLET, FILM COATED ORAL at 21:17

## 2021-06-21 RX ADMIN — HYDRALAZINE HYDROCHLORIDE 25 MG: 25 TABLET, FILM COATED ORAL at 08:46

## 2021-06-21 RX ADMIN — INSULIN HUMAN 2 UNITS: 100 INJECTION, SOLUTION PARENTERAL at 21:22

## 2021-06-21 RX ADMIN — FENTANYL CITRATE 50 MCG: 50 INJECTION, SOLUTION INTRAMUSCULAR; INTRAVENOUS at 14:45

## 2021-06-21 RX ADMIN — AMLODIPINE BESYLATE 10 MG: 10 TABLET ORAL at 05:43

## 2021-06-21 RX ADMIN — ATORVASTATIN CALCIUM 40 MG: 40 TABLET, FILM COATED ORAL at 17:43

## 2021-06-21 RX ADMIN — SODIUM CHLORIDE, POTASSIUM CHLORIDE, SODIUM LACTATE AND CALCIUM CHLORIDE: 600; 310; 30; 20 INJECTION, SOLUTION INTRAVENOUS at 15:03

## 2021-06-21 RX ADMIN — HYDRALAZINE HYDROCHLORIDE 25 MG: 25 TABLET, FILM COATED ORAL at 01:01

## 2021-06-21 RX ADMIN — KETOROLAC TROMETHAMINE 15 MG: 30 INJECTION, SOLUTION INTRAMUSCULAR at 15:31

## 2021-06-21 RX ADMIN — HEPARIN SODIUM 5000 UNITS: 5000 INJECTION, SOLUTION INTRAVENOUS; SUBCUTANEOUS at 21:17

## 2021-06-21 RX ADMIN — HYDRALAZINE HYDROCHLORIDE 25 MG: 25 TABLET, FILM COATED ORAL at 13:38

## 2021-06-21 RX ADMIN — CYANOCOBALAMIN TAB 500 MCG 1000 MCG: 500 TAB at 05:44

## 2021-06-21 RX ADMIN — HEPARIN SODIUM 5000 UNITS: 5000 INJECTION, SOLUTION INTRAVENOUS; SUBCUTANEOUS at 05:44

## 2021-06-21 RX ADMIN — ATROPA BELLADONNA AND OPIUM 30 MG: 16.2; 3 SUPPOSITORY RECTAL at 16:53

## 2021-06-21 RX ADMIN — SODIUM CHLORIDE: 9 INJECTION, SOLUTION INTRAVENOUS at 14:29

## 2021-06-21 RX ADMIN — OXYCODONE 5 MG: 5 TABLET ORAL at 07:43

## 2021-06-21 RX ADMIN — CEFAZOLIN SODIUM 2 G: 2 INJECTION, SOLUTION INTRAVENOUS at 08:46

## 2021-06-21 RX ADMIN — DOCUSATE SODIUM 50 MG AND SENNOSIDES 8.6 MG 2 TABLET: 8.6; 5 TABLET, FILM COATED ORAL at 05:43

## 2021-06-21 RX ADMIN — CEFAZOLIN SODIUM 2 G: 2 INJECTION, SOLUTION INTRAVENOUS at 21:18

## 2021-06-21 RX ADMIN — CEFAZOLIN SODIUM 2 G: 2 INJECTION, SOLUTION INTRAVENOUS at 13:38

## 2021-06-21 RX ADMIN — MORPHINE SULFATE 2 MG: 4 INJECTION INTRAVENOUS at 08:03

## 2021-06-21 RX ADMIN — PROPOFOL 140 MG: 10 INJECTION, EMULSION INTRAVENOUS at 14:31

## 2021-06-21 RX ADMIN — SODIUM CHLORIDE, POTASSIUM CHLORIDE, SODIUM LACTATE AND CALCIUM CHLORIDE 1000 ML: 600; 310; 30; 20 INJECTION, SOLUTION INTRAVENOUS at 01:01

## 2021-06-21 ASSESSMENT — LIFESTYLE VARIABLES
AVERAGE NUMBER OF DAYS PER WEEK YOU HAVE A DRINK CONTAINING ALCOHOL: 0
HAVE PEOPLE ANNOYED YOU BY CRITICIZING YOUR DRINKING: NO
ON A TYPICAL DAY WHEN YOU DRINK ALCOHOL HOW MANY DRINKS DO YOU HAVE: 0
EVER HAD A DRINK FIRST THING IN THE MORNING TO STEADY YOUR NERVES TO GET RID OF A HANGOVER: NO
ALCOHOL_USE: NO
EVER FELT BAD OR GUILTY ABOUT YOUR DRINKING: NO
CONSUMPTION TOTAL: NEGATIVE
SUBSTANCE_ABUSE: 0
TOTAL SCORE: 0
HAVE YOU EVER FELT YOU SHOULD CUT DOWN ON YOUR DRINKING: NO
DOES PATIENT WANT TO STOP DRINKING: NO
HOW MANY TIMES IN THE PAST YEAR HAVE YOU HAD 5 OR MORE DRINKS IN A DAY: 0

## 2021-06-21 ASSESSMENT — ENCOUNTER SYMPTOMS
VOMITING: 0
DIZZINESS: 0
CHILLS: 0
MYALGIAS: 0
FLANK PAIN: 1
BRUISES/BLEEDS EASILY: 0
DEPRESSION: 0
DIARRHEA: 0
FALLS: 0
HEARTBURN: 0
PALPITATIONS: 0
BLURRED VISION: 0
FEVER: 0
SHORTNESS OF BREATH: 0
NAUSEA: 0
BACK PAIN: 0
SORE THROAT: 0
NERVOUS/ANXIOUS: 1
DOUBLE VISION: 0
HEADACHES: 0
ABDOMINAL PAIN: 0
COUGH: 0

## 2021-06-21 ASSESSMENT — COGNITIVE AND FUNCTIONAL STATUS - GENERAL
MOBILITY SCORE: 24
SUGGESTED CMS G CODE MODIFIER DAILY ACTIVITY: CH
DAILY ACTIVITIY SCORE: 24
SUGGESTED CMS G CODE MODIFIER MOBILITY: CH

## 2021-06-21 ASSESSMENT — PAIN DESCRIPTION - PAIN TYPE
TYPE: SURGICAL PAIN
TYPE: ACUTE PAIN
TYPE: SURGICAL PAIN
TYPE: SURGICAL PAIN
TYPE: ACUTE PAIN
TYPE: SURGICAL PAIN
TYPE: ACUTE PAIN
TYPE: SURGICAL PAIN
TYPE: ACUTE PAIN
TYPE: SURGICAL PAIN
TYPE: SURGICAL PAIN
TYPE: ACUTE PAIN

## 2021-06-21 ASSESSMENT — FIBROSIS 4 INDEX: FIB4 SCORE: 5.14

## 2021-06-21 NOTE — ED TRIAGE NOTES
"Chief Complaint   Patient presents with   • RLQ Pain     starting around 1900. hx of kidney stones.  However patient reports pain is different and at different location.      Patient present the ED for RLQ pain.  Patient denies back pain.  Describes pain as \"sharp and stabbing.\"  Denies CP/SOB.     Patient appears uncomfortable in triage. Charge aware.  Does not meet Code Aorta criteria.      Pt is alert and oriented, speaking in full sentences, follows commands and responds appropriately to questions. Resp are even and unlabored.     Pt placed in waiting room. Pt educated on triage process. Pt encouraged to alert staff for any changes.    Patient and staff wearing appropriate PPE    BP (!) 219/119   Pulse 86   Temp 36.6 °C (97.9 °F) (Temporal)   Resp 16   Wt 86.2 kg (190 lb)   SpO2 99%   BMI 29.76 kg/m²     "

## 2021-06-21 NOTE — PROGRESS NOTES
Assumed care of patient at 0030. Patient is A&O x 4. Bed alarm is on. Patient updated on plan of care, oriented to the room and call light system; no questions or concerns at this time. Pt arrived to floor with call phone and one bag of personal items. Call light is within reach. Hourly rounding in place.
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
## 2021-06-21 NOTE — CARE PLAN
The patient is Stable - Low risk of patient condition declining or worsening    Shift Goals  Clinical Goals: Manage pain    Progress made toward(s) clinical / shift goals:    Pt pain is being managed per MAR. Pt pain evaluated using the 0-10 pain scale.    Problem: Pain - Standard  Goal: Alleviation of pain or a reduction in pain to the patient’s comfort goal  Outcome: Progressing     Problem: Knowledge Deficit - Standard  Goal: Patient and family/care givers will demonstrate understanding of plan of care, disease process/condition, diagnostic tests and medications  Outcome: Progressing     Problem: Urinary - Renal Perfusion  Goal: Ability to achieve and maintain adequate renal perfusion and functioning will improve  Outcome: Progressing

## 2021-06-21 NOTE — OP REPORT
Urologic Surgery Operative Report     Date of Procedure: 6/21/2021    Pre-op Diagnosis: 1. Right urinary stone  2. Right hydronephrosis  3. Right renal colic     Post-op Diagnosis: Same as above  4. Bladder stone   Procedure: 1. Cystoscopy, RIght Ureteroscopy w/ stone basket extraction, JJ stent: [id number]   2. Right retrograde pyelogram   3. Removal of bladder stones   Surgeon: Surgeon(s) and Role:     * Melvin Tolliver M.D. - Primary       Anesthesia: General, ET  Anesthesiologist: Rayshawn Bruno M.D.   Estimated Blood Loss: 5ml       Specimens: 1. Right Stone and bladder stones for chemical analysis    Complications: None   Condition: Stable, procedure well tolerated    Drains: 1. Right [id number], JJ stent(off strings)  2. 18fr thornton   Disposition:  1. PACU, return to medicine service, remove thornton tomorrow morning and voiding trial  2. F/u, 2-3 weeks for right ureteral stent removal   Findings 1. Multiple 2-4 mm stone at Right distal ureter  2.  Cystourethroscopy demonstrated: Normal urethra with no strictures.  The prostate had trilobar hyperplasia with large obstructing kissing lateral lobes.  Moderate median lobe tissue.  The bladder had severe trabeculation multiple diverticuli, the largest posterior left side of the bladder with multiple bladder stones within the diverticuli.  These were mostly flushed out and removed through the cystoscope no other bladder masses were appreciated.  The right ureteral orifice had significant edema and the orifice was wide caliber  3.  Ureteroscopy demonstrated: Multiple small stone fragments and significant edema and clot and/or soft tissue in the distal right ureter the majority of which was removed.  The ureter was extremely friable within the distal right ureter.  Only able to perform rigid ureteroscope to the distal aspect.  Unable to inspect to the mid or more proximal ureter due to the edema.  4.  Retrograde ureteropyelogram: Moderate right hydronephrosis with no other  filling defects appreciated in the proximal ureter or calyces.  Right ureteral stent was confirmed in good position using fluoroscopy of the proximal curl      Indication:   This patient is an 81-year-old male who underwent right ureteroscopy on 6/17/2021 for an obstructing right mid ureteral stone.  His stent was removed the following day and he was discharged.  He represented to the emergency room with significant flank pain 2 days later.  CT scan demonstrated significant hydronephrosis down to the level of the distal ureter and there were multiple small stone fragments within the distal ureter.  His pain was not well controlled and therefore he was indicated for the above procedure..  After a full discussion of alternatives, risks, and benefits the patient consented to proceeding with Ureteroscopy, laser lithotripsy and possible JJ stent placement on the respective side.  He understands the risk of inability to access ureter, the need for second procedures, the possibility of negative ureteroscopy, that he may have stent discomfort until this is removed, bleeding, infection, ureteral injury or stricture, bladder injury, post op urinary retention requiring thornton catheter, and the general pulmonary and cardiovascular risks associated with anesthesia.     Procedure Details:   The patient was taken to operating room and placed on table in supine position.  Ancef was administered prior to the start of the procedure based on previous urine cultures. Sequential compression devices were placed for deep venous thrombosis prophylaxis. After induction of general anesthesia, both legs were placed in Michael stirrups in the standard lithotomy position.  A timeout was held confirming the correct patient, procedure and laterality.   The perineal area was prepped and draped in a sterile fashion. A 21 Arabic rigid cystoscope was inserted into the urethra and the bladder was emptied and then distended. See above cystoscopic  findings.     Using a 6 Nigerian open-ended ureteral catheter helped guide the sensor tip wire into the right ureteral orifice and under fluoroscopy this appeared to be within the collecting system up into the kidney.  Semirigid ureteroscope was then used. The wire was moved to the side and a semirigid ureteroscope was placed into the urethra and passed up the Right ureter until the stone was visualized in the distal ureter.  There were multiple small stone fragments that were basketed and then removed.  There is also significant edema and soft tissue and/or resolving blood clot that was basket extracted.  Trying to maneuver through the edematous distal ureter was challenging and therefore this was aborted after all of the major fragments that could be visualized were removed.  Of note the mid ureter was not completely inspected.  Using a dual lumen 10 Nigerian catheter this was ran over the wire to the level of the mid ureter and then contrast was pushed through the dual-lumen with the above radiographic findings.  An additional sensor tip wire was placed to help aid in maneuvering the distal ureter and then repeat ureteroscopy showed no residual stone fragments.       Returnign to rigid cystoscope, we then placed a Right-sided JJ stent, [id number], JJ stent off strings under fluoroscopy. We then saw that the JJ stent was in appropriate position, with a good curl visualized in the renal pelvis fluoroscopically and a good curl in the bladder endoscopically.     Removal of bladder stones: The bladder was filled and emptied multiple times to remove the small round bladder stones that it collected in the base of the bladder as well as in the bladder diverticulum as noted above.  These were sent with the other stone fragments from the right ureter as one specimen.    Due to the large prostate and small amount of bleeding from the right ureteral orifice coupled with his age I decided to leave a Powell catheter in his bladder  overnight and this can be removed tomorrow.    The cystoscope was removed after emptying the bladder.  The patient was awoken from anesthesia and brought to recovery in satisfactory condition.        Melvin Tolliver M.D.   [address]  [place] [address]   [phone]

## 2021-06-21 NOTE — ASSESSMENT & PLAN NOTE
Not on home meds  SBP > 200 in ED  Started on amlodipine 10mg and hydralazine 25mg TID  Pain control  PRN labetalol
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
## 2021-06-21 NOTE — ANESTHESIA POSTPROCEDURE EVALUATION
Patient: Jerry Whitfield    Procedure Summary     Date: 06/21/21 Room / Location: [place] [address] / SURGERY [facility]    Anesthesia Start: 1429 Anesthesia Stop: 1541    Procedures:       CYSTOSCOPY, WITH URETERAL STENT INSERTION (Right Bladder)      URETEROSCOPY, PYELOGRAM (Right Ureter) Diagnosis: (RIGHT HYDRONEPHROSIS, RIGHT URETERAL STONE, BLADDER STONES)    Surgeons: Melvin Tolliver M.D. Responsible Provider: Rayshawn Bruno M.D.    Anesthesia Type: general ASA Status: 2          Final Anesthesia Type: general  Last vitals  BP   Blood Pressure : 155/79    Temp   36.8 °C (98.3 °F)    Pulse   80   Resp   14    SpO2   92 %      Anesthesia Post Evaluation    Patient location during evaluation: PACU  Patient participation: complete - patient participated  Level of consciousness: awake and alert    Airway patency: patent  Anesthetic complications: no  Cardiovascular status: hemodynamically stable  Respiratory status: acceptable  Hydration status: euvolemic    PONV: none          No complications documented.     Nurse Pain Score: 0 (NPRS)

## 2021-06-21 NOTE — ASSESSMENT & PLAN NOTE
Plan of care and rationale for hospitalization discussed with patient  Full CODE STATUS confirmed  ACP: 20 minutes

## 2021-06-21 NOTE — ED PROVIDER NOTES
ED Provider Note    CHIEF COMPLAINT  Chief Complaint   Patient presents with   • RLQ Pain     starting around 1900. hx of kidney stones.  However patient reports pain is different and at different location.        [facility]    Primary care provider: Dick Saucedo M.D.  Means of arrival: POV/walk-in  History obtained from: Patient and daughter-in-law  History limited by: Nothing    Jerry Whitfield is a 81 y.o. male who presents with severe sharp right lower quadrant pain.  Began suddenly at 7 PM tonight.  No lifting or straining or trauma.  He has had kidney stones before most recently had lithotripsy done 3 days ago, but that usually is a flank pain this is isolated to his right lower quadrant.  This occasionally radiates to his back but it is mostly at very severe sharp focal right lower quadrant pain.  Patient is worried it could be his appendix because it does not feel exactly like his usual kidney stone pain.  No alleviating measures attempted at home.  No aggravating factors.  Associated with nausea no vomiting.  No chest pain.  No fevers or cough or dyspnea.  No known close Covid contacts.  Symptoms have been constant since onset but do wax and wane in severity.  He had one episode of hematuria earlier today.    REVIEW OF SYSTEMS  Constitutional: Negative for fever or chills.   HENT: Negative for rhinorrhea or sore throat.    Respiratory: Negative for cough or shortness of breath.    Cardiovascular: Negative for chest pain or palpitations.   Gastrointestinal: Positive for nausea and right lower quadrant abdominal pain, no vomiting.  Genitourinary: Negative for dysuria or flank pain.  Positive for 1 episode of hematuria.  Musculoskeletal: Negative for back pain or joint pain.   Skin: Negative for itching or rash.   Neurological: Negative for sensory or motor changes.   See HPI for further details. All other systems are negative.     PAST MEDICAL HISTORY   has a past medical history of Alcohol abuse, in  "remission (9/4/2013), Elevated TSH (9/4/2013), Hypertension, Non-Hodgkin's lymphoma (HCC) (9/4/2013), and Tobacco abuse, in remission (9/4/2013).    PAST FAMILY HISTORY  Family History   Problem Relation Age of Onset   • Cancer Neg Hx    • Diabetes Neg Hx    • Heart Disease Neg Hx    • Stroke Neg Hx    • Hypertension Neg Hx        SOCIAL HISTORY  Social History     Tobacco Use   • Smoking status: Former Smoker     Packs/day: 0.50     Years: 5.00     Pack years: 2.50     Types: Cigarettes   • Smokeless tobacco: Former User     Types: Chew     Quit date: 1/1/1994   • Tobacco comment: quit chew 1990; chewed x 20 years   Substance and Sexual Activity   • Alcohol use: No   • Drug use: No   • Sexual activity: Not on file       SURGICAL HISTORY   has a past surgical history that includes hernia repair (2002); orbitotomy (8/6/2013); biopsy general (8/6/2013); cystoscopy,insert ureteral stent (Right, 6/17/2021); cysto/uretero/pyeloscopy, dx (Right, 6/17/2021); and lasertripsy (Right, 6/17/2021).    CURRENT MEDICATIONS  Home Medications     Reviewed by Dominick Garner (Pharmacy Tech) on 06/21/21 at 0006  Med List Status: Complete   Medication Last Dose Status   acetaminophen (TYLENOL) 500 MG Tab 6/21/2021 Active   metFORMIN ER (GLUCOPHAGE XR) 500 MG TABLET SR 24 HR has not Active                ALLERGIES  No Known Allergies    PHYSICAL EXAM  VITAL SIGNS: BP (!) 183/86   Pulse 67   Temp 36.6 °C (97.9 °F) (Temporal)   Resp 16   Ht 1.702 m (5' 7\")   Wt 86.2 kg (190 lb)   SpO2 93%   BMI 29.76 kg/m²    Pulse ox interpretation: On room air, I interpret this pulse ox as normal.  Constitutional: Lying on the stretcher in moderate distress.  HEENT: Normocephalic, atraumatic. Posterior pharynx clear, mucous membranes very dry.  Eyes:  EOMI. Normal sclerae.  Neck: Supple, nontender.  Chest/Pulmonary: Diminished to ausculation bilaterally, no wheezes or rhonchi.  Cardiovascular: Regular rate and rhythm, subtle systolic murmur. "   Abdomen: Soft, focal tenderness over the right lower quadrant with localized guarding, no masses.  Back: No CVA or midline tenderness.   Musculoskeletal: No deformity or edema.  Neuro: Clear speech, normal coordination, cranial nerves II-XII grossly intact, no focal asymmetry or sensory deficits.   Psych: Uncomfortable appearing but cooperative.  Skin: No rashes, warm and dry.      DIAGNOSTIC STUDIES / PROCEDURES    LABS & EKG  Results for orders placed or performed during the hospital encounter of 06/20/21   CBC WITH DIFFERENTIAL   Result Value Ref Range    WBC 6.5 4.8 - 10.8 K/uL    RBC 4.92 4.70 - 6.10 M/uL    Hemoglobin 15.0 14.0 - 18.0 g/dL    Hematocrit 43.9 42.0 - 52.0 %    MCV 89.2 81.4 - 97.8 fL    MCH 30.5 27.0 - 33.0 pg    MCHC 34.2 33.7 - 35.3 g/dL    RDW 41.3 35.9 - 50.0 fL    Platelet Count 140 (L) 164 - 446 K/uL    MPV 11.0 9.0 - 12.9 fL    Neutrophils-Polys 69.90 44.00 - 72.00 %    Lymphocytes 20.40 (L) 22.00 - 41.00 %    Monocytes 6.40 0.00 - 13.40 %    Eosinophils 2.00 0.00 - 6.90 %    Basophils 0.50 0.00 - 1.80 %    Immature Granulocytes 0.80 0.00 - 0.90 %    Nucleated RBC 0.00 /100 WBC    Neutrophils (Absolute) 4.57 1.82 - 7.42 K/uL    Lymphs (Absolute) 1.33 1.00 - 4.80 K/uL    Monos (Absolute) 0.42 0.00 - 0.85 K/uL    Eos (Absolute) 0.13 0.00 - 0.51 K/uL    Baso (Absolute) 0.03 0.00 - 0.12 K/uL    Immature Granulocytes (abs) 0.05 0.00 - 0.11 K/uL    NRBC (Absolute) 0.00 K/uL   COMP METABOLIC PANEL   Result Value Ref Range    Sodium 138 135 - 145 mmol/L    Potassium 3.9 3.6 - 5.5 mmol/L    Chloride 103 96 - 112 mmol/L    Co2 19 (L) 20 - 33 mmol/L    Anion Gap 16.0 7.0 - 16.0    Glucose 157 (H) 65 - 99 mg/dL    Bun 25 (H) 8 - 22 mg/dL    Creatinine 1.48 (H) 0.50 - 1.40 mg/dL    Calcium 9.2 8.5 - 10.5 mg/dL    AST(SGOT) 34 12 - 45 U/L    ALT(SGPT) 15 2 - 50 U/L    Alkaline Phosphatase 93 30 - 99 U/L    Total Bilirubin 0.7 0.1 - 1.5 mg/dL    Albumin 4.6 3.2 - 4.9 g/dL    Total Protein 7.7 6.0 -  8.2 g/dL    Globulin 3.1 1.9 - 3.5 g/dL    A-G Ratio 1.5 g/dL   LIPASE   Result Value Ref Range    Lipase 21 11 - 82 U/L   URINALYSIS    Specimen: Urine   Result Value Ref Range    Color Orange (A)     Character Turbid (A)     Specific Gravity 1.020 <1.035    Ph 5.0 5.0 - 8.0    Glucose 100 (A) Negative mg/dL    Ketones 15 (A) Negative mg/dL    Protein 300 (A) Negative mg/dL    Bilirubin Negative Negative    Urobilinogen, Urine 0.2 Negative    Nitrite Negative Negative    Leukocyte Esterase Small (A) Negative    Occult Blood Large (A) Negative    Micro Urine Req Microscopic    LACTIC ACID   Result Value Ref Range    Lactic Acid 1.3 0.5 - 2.0 mmol/L   TROPONIN   Result Value Ref Range    Troponin T 14 6 - 19 ng/L   SARS-COV Antigen RADHA: Collect dry nasal swab AND NP swab in VTM   Result Value Ref Range    SARS-CoV-2 Source Nasal Swab     SARS-COV ANTIGEN RADHA NotDetected Not-Detected   SARS-CoV-2 PCR (24 hour In-House): Collect NP swab in VTM    Specimen: Nasopharyngeal; Respirate   Result Value Ref Range    SARS-CoV-2 Source NP Swab    URINE MICROSCOPIC (W/UA)   Result Value Ref Range    WBC 10-20 (A) /hpf    RBC >150 (A) /hpf    Bacteria Negative None /hpf    Epithelial Cells Negative /hpf    Hyaline Cast 0-2 /lpf   ESTIMATED GFR   Result Value Ref Range    GFR If  55 (A) >60 mL/min/1.73 m 2    GFR If Non African American 45 (A) >60 mL/min/1.73 m 2   EKG   Result Value Ref Range    Report       [facility] Emergency Dept.    Test Date:  2021  Pt Name:    KELVIN KAUFMAN               Department: ER  MRN:        [id number]                      Room:       [facility]  Gender:     Male                         Technician: [id number]  :        [id number]                   Requested By:LACHO MCINTOSH  Order #:    [id number]                    Reading MD: Lacho Mcintosh MD    Measurements  Intervals                                Axis  Rate:       84                           P:           52  MO:         144                          QRS:        55  QRSD:       90                           T:          44  QT:         372  QTc:        440    Interpretive Statements  SINUS RHYTHM  PROBABLE LEFT ATRIAL ABNORMALITY  Compared to ECG 06/17/2021 11:49:53  No significant changes  No STEMI  Electronically Signed On 6- 0:21:29 PDT by Lacho Sutherland MD         RADIOLOGY  CT-ABDOMEN-PELVIS W/O   Final Result      1.  Multiple right distal ureteral calculi measuring in size from 2 mm to 4 mm and producing moderate hydronephrosis      2.  9 mm calculus previously within the right ureter has likely been fragmented at its is no longer identified      3.  Multiple bladder calculi      4.  Cholelithiasis      5.  Colonic diverticulosis      6.  Enlarged prostate      DX-CHEST-PORTABLE (1 VIEW)   Final Result      No acute cardiopulmonary abnormality identified.          COURSE & MEDICAL DECISION MAKING    This is a 81 y.o. male who presents with severe right lower quadrant abdominal pain.    Differential Diagnosis includes but is not limited to:  Kidney stone, infection, obstruction, appendicitis, perforation, aortic disease    ED Course:  81-year-old male with above complaints.  Extremely hypertensive, hopefully due to pain I will give him pain medicine, and screen with advanced imaging labs and urinalysis and EKG given age and concerning presentation.    Thankfully labs are stable, there is blood in the urine, and CT scan shows multiple right distal ureteral calculi from 2 to 4 mm producing some moderate hydronephrosis.  No other surgical disease identified on scan.  No fevers white count normal.  No strong markers of infection on urinalysis we will hold antibiotics until discussion with urology.    The patient's urologist Dr. Chung is actually on-call today, discussed the case with him.  Given the patient's age and he has some hydronephrosis and his vital signs plan to hospitalize overnight, urology team  will evaluate the patient in the morning.  Patient feeling much better after IV pain meds, but again given age I think it is reasonable to hospitalize and make sure he does not have any recurrence of symptoms or worsening of kidney function.  Hospitalist Dr. Meehan consulted they will kindly admit for further work-up and treatment.    Medications   senna-docusate (PERICOLACE or SENOKOT S) 8.6-50 MG per tablet 2 tablet (has no administration in time range)     And   polyethylene glycol/lytes (MIRALAX) PACKET 1 Packet (has no administration in time range)     And   magnesium hydroxide (MILK OF MAGNESIA) suspension 30 mL (has no administration in time range)     And   bisacodyl (DULCOLAX) suppository 10 mg (has no administration in time range)   lactated ringers infusion (BOLUS) (has no administration in time range)   heparin injection 5,000 Units (has no administration in time range)   acetaminophen (Tylenol) tablet 650 mg (has no administration in time range)   labetalol (NORMODYNE/TRANDATE) injection 10 mg (has no administration in time range)   ondansetron (ZOFRAN) syringe/vial injection 4 mg (has no administration in time range)   ondansetron (ZOFRAN ODT) dispertab 4 mg (has no administration in time range)   guaiFENesin dextromethorphan (ROBITUSSIN DM) 100-10 MG/5ML syrup 10 mL (has no administration in time range)   Pharmacy Consult Request ...Pain Management Review 1 Each (has no administration in time range)   oxyCODONE immediate-release (ROXICODONE) tablet 2.5 mg (has no administration in time range)     Or   oxyCODONE immediate-release (ROXICODONE) tablet 5 mg (has no administration in time range)     Or   morphine (pf) 4 mg/mL injection 2 mg (has no administration in time range)   lactated ringers infusion (has no administration in time range)   insulin regular (HumuLIN R,NovoLIN R) injection (has no administration in time range)     And   glucose 4 g chewable tablet 16 g (has no administration in time  range)     And   dextrose 50% (D50W) injection 50 mL (has no administration in time range)   ceFAZolin in dextrose (ANCEF) IVPB premix 2 g (has no administration in time range)   atorvastatin (LIPITOR) tablet 40 mg (has no administration in time range)   amLODIPine (NORVASC) tablet 10 mg (has no administration in time range)   hydrALAZINE (APRESOLINE) tablet 25 mg (has no administration in time range)   cyanocobalamin (VITAMIN B-12) tablet 1,000 mcg (has no administration in time range)   lactated ringers infusion (BOLUS) (0 mL Intravenous Stopped 6/20/21 2208)   HYDROmorphone (Dilaudid) injection 1 mg (1 mg Intravenous Given 6/20/21 2049)   ondansetron (ZOFRAN) syringe/vial injection 4 mg (4 mg Intravenous Given 6/20/21 2049)       FINAL IMPRESSION  1. Ureterolithiasis    2. Essential hypertension    3. Acute abdominal pain    4. Type 2 diabetes mellitus with diabetic nephropathy, without long-term current use of insulin (HCC)    5. Hypertensive urgency    6. Dehydration    7. Hyperglycemia        -ADMIT-      Pertinent Labs & Imaging studies reviewed and verified by myself, as well as nursing notes and the patient's past medical, family, and social histories (See chart for details).    Portions of this record were made with voice recognition software.  Despite my review, spelling/grammar/context errors may still remain.  Interpretation of this chart should be taken in this context.    Electronically signed by Lacho Sutherland M.D. on 6/21/2021 at 12:26 AM.

## 2021-06-21 NOTE — ASSESSMENT & PLAN NOTE
HbA1c 7.8  At home: Metformin holding due to upcoming cystoscopy  ISS  Hypoglycemia protocol  Atorvastatin 40 mg

## 2021-06-21 NOTE — CONSULTS
[facility] [place] Consult/H&P Note    Patient's Name/MRN: Jerry Whitfield, [id number]   Room #: T408/01    Admit Date:6/20/2021  Today's Date: 6/21/2021   Length of stay:  LOS: 1 day      Reason for consult/chief complaint: RLQ pain  ID/HPI: Jerry Whitfield is a 81 y.o. male patient who p/w severe 9/10 flank pain on right side. He has had +N, +V, -F, -C.  This is his second episode of stones. Has had prior stone procedures in past, most recently 6/17 where he had a ureteroscopy for 9mm stone by Dr. Chung. Re-presented to the ER for uncontrollable R flank pain. In ER, WBC was WNL, UA was not c/w infection, and Cr was 1.46, near baseline.  CT was done showing small fragments measuring 2-4mm in the distal R ureter.     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    Chewed 24/7 x 20 years; quit early 90's        Past Surgical History:   Past Surgical History:   Procedure Laterality Date   • PB CYSTOSCOPY,INSERT URETERAL STENT Right 6/17/2021    Procedure: CYSTOSCOPY, WITH URETERAL STENT INSERTION;  Surgeon: Urban Chung M.D.;  Location: SURGERY [facility];  Service: Urology   • PB CYSTO/URETERO/PYELOSCOPY, DX Right 6/17/2021    Procedure: URETEROSCOPY;  Surgeon: Urban Chung M.D.;  Location: SURGERY [facility];  Service: Urology   • LASERTRIPSY Right 6/17/2021    Procedure: LITHOTRIPSY, USING LASER;  Surgeon: Urban Chung M.D.;  Location: [facility];  Service: Urology   • ORBITOTOMY  8/6/2013    Performed by Johnny Ellington M.D. at SURGERY SAME DAY [facility] ORS   • BIOPSY GENERAL  8/6/2013    Performed by Johnny Ellington M.D. at SURGERY SAME DAY [facility] ORS   • HERNIA REPAIR  2002     "    Family History:   Family History   Problem Relation Age of Onset   • Cancer Neg Hx    • Diabetes Neg Hx    • Heart Disease Neg Hx    • Stroke Neg Hx    • Hypertension Neg Hx          Social History:   Social History     Tobacco Use   • Smoking status: Former Smoker     Packs/day: 0.50     Years: 5.00     Pack years: 2.50     Types: Cigarettes   • Smokeless tobacco: Former User     Types: Chew     Quit date: 1/1/1994   • Tobacco comment: quit chew 1990; chewed x 20 years   Substance Use Topics   • Alcohol use: No   • Drug use: No      Social History     Social History Narrative   • Not on file        Allergies: he Patient has no known allergies.    Medications:   Medications Prior to Admission   Medication Sig Dispense Refill Last Dose   • acetaminophen (TYLENOL) 500 MG Tab Take 1,000 mg by mouth every 8 hours as needed for Mild Pain.   6/21/2021 at 0600   • metFORMIN ER (GLUCOPHAGE XR) 500 MG TABLET SR 24 HR Take 1 tablet by mouth 2 times a day. If you develop too much diarrhea, reduce to 1 tablet once each day. 60 tablet 0 has not at started         Review of Systems  ROS     Physical Exam  VITAL SIGNS: /95   Pulse 66   Temp 36.6 °C (97.9 °F) (Temporal)   Resp 16   Ht 1.702 m (5' 7\")   Wt 86.4 kg (190 lb 7.6 oz)   SpO2 92%   BMI 29.83 kg/m²   GENERAL:  alert, in no acute distress  EYES: EOMI and normal accomodation  Neck: Supple.   CHEST AND LUNGS: good air entry, no audible wheezes  CARDIOVASCULAR: Rate is regular, no peripheral edema.   ABDOMEN: Abdomen soft, + RLQ pain with tender palpation.  : no cvat  EXTREMITIES: Warm and well perfused without c/c/e  NEURO: No focal deficits  SKIN: Skin color, texture, turgor normal. No rashes, lesions, nor jaundice.      Labs:  Recent Labs     06/20/21 2038 06/21/21  0408   WBC 6.5 6.3   RBC 4.92 4.47*   HEMOGLOBIN 15.0 13.6*   HEMATOCRIT 43.9 41.4*   MCV 89.2 92.6   MCH 30.5 30.4   MCHC 34.2 32.9*   RDW 41.3 43.2   PLATELETCT 140* 110*   MPV 11.0 10.7 "     Recent Labs     06/20/21 2038 06/21/21  0408   SODIUM 138 134*   POTASSIUM 3.9 4.1   CHLORIDE 103 104   CO2 19* 21   GLUCOSE 157* 137*   BUN 25* 25*   CREATININE 1.48* 1.47*   CALCIUM 9.2 8.9         Glucose:  Lab Results   Component Value Date/Time    GLUCOSE 137 (H) 06/21/2021 04:08 AM    GLUCOSE 157 (H) 06/20/2021 08:38 PM    GLUCOSE 195 (H) 06/18/2021 12:37 AM    GLUCOSE 138 (H) 06/17/2021 06:51 AM     Coags:  No results found for: INR      Urinalysis:   Lab Results   Component Value Date/Time    COLORURINE [place] (A) 06/20/2021 09:15 PM    CLARITY Turb[place] (A) 06/20/2021 09:15 PM    SPECGRAVITY 1.020 06/20/2021 09:15 PM    PHURINE 5.0 06/20/2021 09:15 PM    GLUCOSEUR 100 (A) 06/20/2021 09:15 PM    KETONES 15 (A) 06/20/2021 09:15 PM    NITRITE Negative 06/20/2021 09:15 PM    OCCULTBLOOD Large (A) 06/20/2021 09:15 PM    RBCURINE >150 (A) 06/20/2021 09:15 PM    BACTERIA Negative 06/20/2021 09:15 PM    EPITHELCELL Negative 06/20/2021 09:15 PM       Imaging:                                   Results for orders placed during the hospital encounter of 06/20/21    CT-ABDOMEN-PELVIS W/O    Impression  1.  Multiple right distal ureteral calculi measuring in size from 2 mm to 4 mm and producing moderate hydronephrosis    2.  9 mm calculus previously within the right ureter has likely been fragmented at its is no longer identified    3.  Multiple bladder calculi    4.  Cholelithiasis    5.  Colonic diverticulosis    6.  Enlarged prostate                    Assessment/Recommendation   81 y.o.male with small fragments measuring 2-4mm in the distal R ureter.  He understands the risk of inability to access ureter, the need for second procedures, the possibility of negative ureteroscopy, that he may have stent discomfort until this is removed, bleeding, infection, ureteral injury or stricture, bladder injury, post op urinary retention requiring thornton catheter, and the general pulmonary and cardiovascular risks associated  with anesthesia.  After a full discussion of the alternatives risks and benefits of the procedure, the patient consented to proceeding with the planned procedure.     · Consent obatined  · Add on for Cystoscopy, right ureteroscopy, Laser lithotripsy and JJ stents (CULTS)       [facility]   [address] [place] [address]   [phone]

## 2021-06-21 NOTE — ED NOTES
Med rec updated and complete. Allergies reviewed. Met with pt at bedside.   Pt denies antibiotic use in last 14 days. Pt ws given a prescription for metformin but has not started it.      Home pharmacy [facility] [place] [phone]    No current facility-administered medications on file prior to encounter.     Current Outpatient Medications on File Prior to Encounter   Medication Sig Dispense Refill   • acetaminophen (TYLENOL) 500 MG Tab Take 1,000 mg by mouth every 8 hours as needed for Mild Pain.

## 2021-06-21 NOTE — ASSESSMENT & PLAN NOTE
As noted in obstructive uropathy since 6/7  Stable   Hopefully will improve   Avoid nephrotoxic meds

## 2021-06-21 NOTE — ANESTHESIA PROCEDURE NOTES
Airway    Date/Time: 6/21/2021 2:32 PM  Performed by: Rayshawn Bruno M.D.  Authorized by: Rayshawn Bruno M.D.     Location:  OR  Urgency:  Elective  Difficult Airway: No    Indications for Airway Management:  Anesthesia      Spontaneous Ventilation: absent    Sedation Level:  Deep  Preoxygenated: Yes    Mask Difficulty Assessment:  1 - vent by mask  Final Airway Type:  Supraglottic airway  Final Supraglottic Airway:  Standard LMA    SGA Size:  4  Number of Attempts at Approach:  1

## 2021-06-21 NOTE — PROGRESS NOTES
4 Eyes Skin Assessment Completed by JOSUÉ Brown and JOSUÉ Dupree.    Head WDL  Ears WDL  Nose WDL  Mouth WDL  Neck WDL  Breast/Chest WDL  Shoulder Blades WDL  Spine WDL  (R) Arm/Elbow/Hand WDL Dry Skin  (L) Arm/Elbow/Hand WDLDry skin  Abdomen WDL  Groin WDL  Scrotum/Coccyx/Buttocks WDL  (R) Leg WDL  (L) Leg WDL  (R) Heel/Foot/Toe WDL  (L) Heel/Foot/Toe WDL          Devices In Places Pulse Ox, PIV      Interventions In Place Pressure Redistribution Mattress    Possible Skin Injury No    Pictures Uploaded Into Epic N/A  Wound Consult Placed N/A  RN Wound Prevention Protocol Ordered No
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
## 2021-06-21 NOTE — ANESTHESIA PREPROCEDURE EVALUATION
Relevant Problems   NEURO   (positive) History of asbestos exposure      CARDIAC   (positive) Essential hypertension   (positive) Hypertensive urgency      GI   (positive) Gastroesophageal reflux disease without esophagitis         (positive) LORENE (acute kidney injury) (HCC)   (positive) Hydronephrosis with urinary obstruction due to renal calculus      ENDO   (positive) Type 2 diabetes mellitus with diabetic nephropathy, without long-term current use of insulin (HCC)       Physical Exam    Airway   Mallampati: II  TM distance: >3 FB  Neck ROM: full       Cardiovascular - normal exam  Rhythm: regular  Rate: normal  (-) murmur     Dental - normal exam        Facial Hair   Pulmonary - normal exam  Breath sounds clear to auscultation     Abdominal    Neurological - normal exam                 Anesthesia Plan    ASA 2       Plan - general       Airway plan will be LMA          Induction: intravenous    Postoperative Plan: Postoperative administration of opioids is intended.    Pertinent diagnostic labs and testing reviewed    Informed Consent:    Anesthetic plan and risks discussed with patient.    Use of blood products discussed with: patient whom consented to blood products.

## 2021-06-21 NOTE — ANESTHESIA TIME REPORT
Anesthesia Start and Stop Event Times     Date Time Event    6/21/2021 1422 Ready for Procedure     1429 Anesthesia Start     1541 Anesthesia Stop        Responsible Staff  06/21/21    Name Role Begin End    Rayshawn Bruno M.D. Anesth 1429 1541        Preop Diagnosis (Free Text):  Pre-op Diagnosis     RIGHT HYDRONEPHROSIS, RIGHT URETERAL STONE        Preop Diagnosis (Codes):    Post op Diagnosis  Right ureteral stone  RIGHT HYDRONEPHROSIS    Premium Reason  A. 3PM - 7AM    Comments:

## 2021-06-21 NOTE — ASSESSMENT & PLAN NOTE
CT AP: multiple right-sided renal calculi causing obstruction resulting in moderate right-sided hydronephrosis, previous 9mm right calculus no longer seen  S/p urology OR intervention 6/17/2021  IVF  Pain control  Abx: Ancef .  Urine culture in process   Urology following

## 2021-06-22 VITALS
TEMPERATURE: 98.4 F | HEART RATE: 85 BPM | DIASTOLIC BLOOD PRESSURE: 64 MMHG | WEIGHT: 190.48 LBS | OXYGEN SATURATION: 94 % | SYSTOLIC BLOOD PRESSURE: 120 MMHG | RESPIRATION RATE: 17 BRPM | HEIGHT: 67 IN | BODY MASS INDEX: 29.9 KG/M2

## 2021-06-22 LAB
ALBUMIN SERPL BCP-MCNC: 3.2 G/DL (ref 3.2–4.9)
ALBUMIN/GLOB SERPL: 1.2 G/DL
ALP SERPL-CCNC: 71 U/L (ref 30–99)
ALT SERPL-CCNC: <5 U/L (ref 2–50)
ANION GAP SERPL CALC-SCNC: 6 MMOL/L (ref 7–16)
AST SERPL-CCNC: 13 U/L (ref 12–45)
BASOPHILS # BLD AUTO: 0.6 % (ref 0–1.8)
BASOPHILS # BLD: 0.03 K/UL (ref 0–0.12)
BILIRUB SERPL-MCNC: 0.5 MG/DL (ref 0.1–1.5)
BUN SERPL-MCNC: 21 MG/DL (ref 8–22)
CALCIUM SERPL-MCNC: 8.5 MG/DL (ref 8.5–10.5)
CHLORIDE SERPL-SCNC: 104 MMOL/L (ref 96–112)
CO2 SERPL-SCNC: 27 MMOL/L (ref 20–33)
CREAT SERPL-MCNC: 1.44 MG/DL (ref 0.5–1.4)
EOSINOPHIL # BLD AUTO: 0.2 K/UL (ref 0–0.51)
EOSINOPHIL NFR BLD: 3.8 % (ref 0–6.9)
ERYTHROCYTE [DISTWIDTH] IN BLOOD BY AUTOMATED COUNT: 42.8 FL (ref 35.9–50)
GLOBULIN SER CALC-MCNC: 2.7 G/DL (ref 1.9–3.5)
GLUCOSE BLD-MCNC: 112 MG/DL (ref 65–99)
GLUCOSE BLD-MCNC: 138 MG/DL (ref 65–99)
GLUCOSE SERPL-MCNC: 96 MG/DL (ref 65–99)
HCT VFR BLD AUTO: 37.9 % (ref 42–52)
HGB BLD-MCNC: 12.8 G/DL (ref 14–18)
IMM GRANULOCYTES # BLD AUTO: 0.05 K/UL (ref 0–0.11)
IMM GRANULOCYTES NFR BLD AUTO: 0.9 % (ref 0–0.9)
LYMPHOCYTES # BLD AUTO: 1.28 K/UL (ref 1–4.8)
LYMPHOCYTES NFR BLD: 24.1 % (ref 22–41)
MCH RBC QN AUTO: 31 PG (ref 27–33)
MCHC RBC AUTO-ENTMCNC: 33.8 G/DL (ref 33.7–35.3)
MCV RBC AUTO: 91.8 FL (ref 81.4–97.8)
MONOCYTES # BLD AUTO: 0.39 K/UL (ref 0–0.85)
MONOCYTES NFR BLD AUTO: 7.3 % (ref 0–13.4)
NEUTROPHILS # BLD AUTO: 3.37 K/UL (ref 1.82–7.42)
NEUTROPHILS NFR BLD: 63.3 % (ref 44–72)
NRBC # BLD AUTO: 0 K/UL
NRBC BLD-RTO: 0 /100 WBC
PLATELET # BLD AUTO: 127 K/UL (ref 164–446)
PMV BLD AUTO: 10.6 FL (ref 9–12.9)
POTASSIUM SERPL-SCNC: 3.9 MMOL/L (ref 3.6–5.5)
PROT SERPL-MCNC: 5.9 G/DL (ref 6–8.2)
RBC # BLD AUTO: 4.13 M/UL (ref 4.7–6.1)
SODIUM SERPL-SCNC: 137 MMOL/L (ref 135–145)
WBC # BLD AUTO: 5.3 K/UL (ref 4.8–10.8)

## 2021-06-22 PROCEDURE — 700111 HCHG RX REV CODE 636 W/ 250 OVERRIDE (IP): Performed by: STUDENT IN AN ORGANIZED HEALTH CARE EDUCATION/TRAINING PROGRAM

## 2021-06-22 PROCEDURE — 700102 HCHG RX REV CODE 250 W/ 637 OVERRIDE(OP): Performed by: STUDENT IN AN ORGANIZED HEALTH CARE EDUCATION/TRAINING PROGRAM

## 2021-06-22 PROCEDURE — 36415 COLL VENOUS BLD VENIPUNCTURE: CPT

## 2021-06-22 PROCEDURE — 80053 COMPREHEN METABOLIC PANEL: CPT

## 2021-06-22 PROCEDURE — 85025 COMPLETE CBC W/AUTO DIFF WBC: CPT

## 2021-06-22 PROCEDURE — 99239 HOSP IP/OBS DSCHRG MGMT >30: CPT | Performed by: STUDENT IN AN ORGANIZED HEALTH CARE EDUCATION/TRAINING PROGRAM

## 2021-06-22 PROCEDURE — A9270 NON-COVERED ITEM OR SERVICE: HCPCS | Performed by: STUDENT IN AN ORGANIZED HEALTH CARE EDUCATION/TRAINING PROGRAM

## 2021-06-22 PROCEDURE — 82962 GLUCOSE BLOOD TEST: CPT

## 2021-06-22 RX ORDER — ATORVASTATIN CALCIUM 40 MG/1
40 TABLET, FILM COATED ORAL EVERY EVENING
Qty: 30 TABLET | Refills: 0 | Status: SHIPPED | OUTPATIENT
Start: 2021-06-22

## 2021-06-22 RX ORDER — HYDRALAZINE HYDROCHLORIDE 25 MG/1
25 TABLET, FILM COATED ORAL EVERY 8 HOURS
Qty: 90 TABLET | Refills: 0 | Status: SHIPPED | OUTPATIENT
Start: 2021-06-22 | End: 2021-07-01

## 2021-06-22 RX ORDER — AMLODIPINE BESYLATE 10 MG/1
10 TABLET ORAL DAILY
Qty: 30 TABLET | Refills: 0 | Status: SHIPPED | OUTPATIENT
Start: 2021-06-23 | End: 2021-07-01

## 2021-06-22 RX ADMIN — CYANOCOBALAMIN TAB 500 MCG 1000 MCG: 500 TAB at 04:49

## 2021-06-22 RX ADMIN — AMLODIPINE BESYLATE 10 MG: 10 TABLET ORAL at 04:50

## 2021-06-22 RX ADMIN — HEPARIN SODIUM 5000 UNITS: 5000 INJECTION, SOLUTION INTRAVENOUS; SUBCUTANEOUS at 04:49

## 2021-06-22 RX ADMIN — CEFAZOLIN SODIUM 2 G: 2 INJECTION, SOLUTION INTRAVENOUS at 04:50

## 2021-06-22 RX ADMIN — DOCUSATE SODIUM 50 MG AND SENNOSIDES 8.6 MG 2 TABLET: 8.6; 5 TABLET, FILM COATED ORAL at 04:49

## 2021-06-22 RX ADMIN — HYDRALAZINE HYDROCHLORIDE 25 MG: 25 TABLET, FILM COATED ORAL at 04:49

## 2021-06-22 ASSESSMENT — PAIN DESCRIPTION - PAIN TYPE: TYPE: ACUTE PAIN

## 2021-06-22 ASSESSMENT — ENCOUNTER SYMPTOMS
NAUSEA: 0
CHILLS: 0
SHORTNESS OF BREATH: 0
ABDOMINAL PAIN: 0
FEVER: 0
VOMITING: 0

## 2021-06-22 NOTE — CARE PLAN
The patient is Stable - Low risk of patient condition declining or worsening    Shift Goals  Clinical Goals: pain control  Patient Goals: Control pain.     Progress made toward(s) clinical / shift goals:  Pt has no complaints of pain at this time.     Patient is not progressing towards the following goals: NA    Problem: Pain - Standard  Goal: Alleviation of pain or a reduction in pain to the patient’s comfort goal  Outcome: Progressing     Problem: Knowledge Deficit - Standard  Goal: Patient and family/care givers will demonstrate understanding of plan of care, disease process/condition, diagnostic tests and medications  Outcome: Progressing     Problem: Hemodynamics  Goal: Patient's hemodynamics, fluid balance and neurologic status will be stable or improve  Outcome: Progressing     Problem: Fluid Volume  Goal: Fluid volume balance will be maintained  Outcome: Progressing     Problem: Urinary - Renal Perfusion  Goal: Ability to achieve and maintain adequate renal perfusion and functioning will improve  Outcome: Progressing     Problem: Respiratory  Goal: Patient will achieve/maintain optimum respiratory ventilation and gas exchange  Outcome: Progressing     Problem: Mechanical Ventilation  Goal: Safe management of artificial airway and ventilation  Outcome: Progressing  Goal: Successful weaning off mechanical ventilator, spontaneously maintains adequate gas exchange  Outcome: Progressing  Goal: Patient will be able to express needs and understand communication  Outcome: Progressing     Problem: Physical Regulation  Goal: Diagnostic test results will improve  Outcome: Progressing  Goal: Signs and symptoms of infection will decrease  Outcome: Progressing
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
## 2021-06-22 NOTE — OR NURSING
Pt AA/Ox4. VSS. Pt denies pain at this time. B&O suppository given. Pt denies numbness or tingling. No nausea or vomiting. SCDs in place.    Intact thornton catheter, drained 300cc reddish to pinkish colored urine.    Pt's daughter-in-law, Jenn, updated regarding plan of care.    Report given to JOSUÉ Islas at [address].    Pt via bed, accompanied by RN, was transferred to [facility] at 1710.    Oxygen tank was 3/4 full when Pt left PACU.    JOSUÉ Islas received Pt at bedside.

## 2021-06-22 NOTE — DISCHARGE INSTRUCTIONS
Discharge Instructions    Discharged to home by car with relative. Discharged via wheelchair, hospital escort: Yes.  Special equipment needed: Not Applicable    Be sure to schedule a follow-up appointment with your primary care doctor or any specialists as instructed.     Discharge Plan:   Diet Plan: Discussed  Activity Level: Discussed  Confirmed Follow up Appointment: Patient to Call and Schedule Appointment  Confirmed Symptoms Management: Discussed  Medication Reconciliation Updated: Yes    I understand that a diet low in cholesterol, fat, and sodium is recommended for good health. Unless I have been given specific instructions below for another diet, I accept this instruction as my diet prescription.   Other diet: regular      Special Instructions: None    · Is patient discharged on Warfarin / Coumadin?   No     Depression / Suicide Risk    As you are discharged from this [facility] facility, it is important to learn how to keep safe from harming yourself.    Recognize the warning signs:  · Abrupt changes in personality, positive or negative- including increase in energy   · Giving away possessions  · Change in eating patterns- significant weight changes-  positive or negative  · Change in sleeping patterns- unable to sleep or sleeping all the time   · Unwillingness or inability to communicate  · Depression  · Unusual sadness, discouragement and loneliness  · Talk of wanting to die  · Neglect of personal appearance   · Rebelliousness- reckless behavior  · Withdrawal from people/activities they love  · Confusion- inability to concentrate     If you or a loved one observes any of these behaviors or has concerns about self-harm, here's what you can do:  · Talk about it- your feelings and reasons for harming yourself  · Remove any means that you might use to hurt yourself (examples: pills, rope, extension cords, firearm)  · Get professional help from the community (Mental Health, Substance Abuse, psychological  counseling)  · Do not be alone:Call your Safe Contact- someone whom you trust who will be there for you.  · Call your local CRISIS HOTLINE 012-4191 or 285-734-5583  · Call your local Children's Mobile Crisis Response Team [place] (528) 003-6014 or www.[employer]  · Call the toll free National Suicide Prevention Hotlines   · National Suicide Prevention Lifeline 253-467-GRQQ (6805)  · National Hope Line Network 800-SUICIDE (518-4552)

## 2021-06-22 NOTE — PROGRESS NOTES
Pt has been discharged from the facility. Pts IV has been removed, discharge paperwork has been reviewed and signed. I wheeled the pt downstairs to his ride.

## 2021-06-22 NOTE — CARE PLAN
The patient is Stable - Low risk of patient condition declining or worsening    Shift Goals  Clinical Goals: remove thornton  Patient Goals: discharge    Progress made toward(s) clinical / shift goals:  pts thornton is removed, hopefully dc today    Problem: Pain - Standard  Goal: Alleviation of pain or a reduction in pain to the patient’s comfort goal  Outcome: Progressing     Problem: Urinary - Renal Perfusion  Goal: Ability to achieve and maintain adequate renal perfusion and functioning will improve  Outcome: Progressing       Patient is not progressing towards the following goals:

## 2021-06-22 NOTE — PROGRESS NOTES
Assessment complete.  AA&Ox4.   SpO2 97% on 2L. Denies SOB.  Tolerating CHO  diet. Denies N/V.  + void via thornton.   LBM 6/20/21.   Pt up with SBA.   All needs met at this time. Call light within reach. Pt calls appropriately.

## 2021-06-22 NOTE — PROGRESS NOTES
Note to reader: this note follows the APSO format rather than the historical SOAP format. Assessment and plan located at the top of the note for ease of use.    Chief Complaint  81 y.o. year old male here with RLQ Pain (starting around 1900. hx of kidney stones.  However patient reports pain is different and at different location. )      Assessment/Plan  Interval History   Active Hospital Problems    Diagnosis    • ACP (advance care planning) [Z71.89]    • Dehydration [E86.0]    • Hypertensive urgency [I16.0]    • Obstructive uropathy [N13.9]    • Hydronephrosis with urinary obstruction due to renal calculus [N13.2]    • HLD (hyperlipidemia) [E78.5]    • Type 2 diabetes mellitus with diabetic nephropathy, without long-term current use of insulin (HCC) [E11.21]    • Thrombocytopenia (HCC) [D69.6]    • B12 deficiency [E53.8]      <200         POD 1 from   [facility]    patient seen and examined    6/22- pain well controlled. No more nausea. Tolerating diet. Powell removed, has not urinated yet.     Disposition  Stable.    PLAN:  As long as patient's PVR is not >300 prior to dc he can go without catheter. If over 300, please replace catheter and I will arrange outpatient follow up for TOV.   He will follow up with [facility] [place] in 2-3w for cysto stent removal, no sooner.  Urology signing off.       Review of Systems  Physical Exam   Review of Systems   Constitutional: Negative for chills and fever.   Respiratory: Negative for shortness of breath.    Cardiovascular: Negative for chest pain.   Gastrointestinal: Negative for abdominal pain, nausea and vomiting.     Vitals:    06/21/21 2024 06/21/21 2327 06/22/21 0436 06/22/21 0815   BP: 127/66 114/71 133/73 146/77   Pulse: 71 69 63 80   Resp: 16 16 16 17   Temp: 36.1 °C (97 °F) 36.1 °C (97 °F) 36.2 °C (97.1 °F) 36.6 °C (97.9 °F)   TempSrc: Temporal Temporal Temporal Temporal   SpO2: 91% 97% 93% 92%   Weight:       Height:         Physical Exam  Vitals and nursing note  reviewed.   Constitutional:       Appearance: Normal appearance.   HENT:      Head: Normocephalic.   Pulmonary:      Effort: Pulmonary effort is normal.   Abdominal:      Palpations: Abdomen is soft.   Skin:     General: Skin is warm and dry.   Neurological:      Mental Status: He is alert.   Psychiatric:         Mood and Affect: Mood normal.         Behavior: Behavior normal.          Hematology Chemistry   Lab Results   Component Value Date/Time    WBC 5.3 06/22/2021 03:36 AM    HEMOGLOBIN 12.8 (L) 06/22/2021 03:36 AM    HEMATOCRIT 37.9 (L) 06/22/2021 03:36 AM    PLATELETCT 127 (L) 06/22/2021 03:36 AM     Lab Results   Component Value Date/Time    SODIUM 137 06/22/2021 03:36 AM    POTASSIUM 3.9 06/22/2021 03:36 AM    CHLORIDE 104 06/22/2021 03:36 AM    CO2 27 06/22/2021 03:36 AM    GLUCOSE 96 06/22/2021 03:36 AM    BUN 21 06/22/2021 03:36 AM    CREATININE 1.44 (H) 06/22/2021 03:36 AM         Labs not explicitly included in this progress note were reviewed by the author.   Radiology/imaging not explicitly included in this progress note was reviewed by the author.     Core Measures

## 2021-06-23 LAB
APPEARANCE STONE: NORMAL
BACTERIA UR CULT: NORMAL
COMPN STONE: NORMAL
NUMBER STONE: NORMAL
SIGNIFICANT IND 70042: NORMAL
SITE SITE: NORMAL
SIZE STONE: NORMAL MM
SOURCE SOURCE: NORMAL
SPECIMEN WT: 29 MG

## 2021-06-24 ENCOUNTER — PATIENT OUTREACH (OUTPATIENT)
Dept: HEALTH INFORMATION MANAGEMENT | Facility: OTHER | Age: 81
End: 2021-06-24

## 2021-06-24 SDOH — ECONOMIC STABILITY: FOOD INSECURITY: WITHIN THE PAST 12 MONTHS, YOU WORRIED THAT YOUR FOOD WOULD RUN OUT BEFORE YOU GOT MONEY TO BUY MORE.: NEVER TRUE

## 2021-06-24 SDOH — ECONOMIC STABILITY: FOOD INSECURITY: WITHIN THE PAST 12 MONTHS, THE FOOD YOU BOUGHT JUST DIDN'T LAST AND YOU DIDN'T HAVE MONEY TO GET MORE.: NEVER TRUE

## 2021-06-24 SDOH — ECONOMIC STABILITY: TRANSPORTATION INSECURITY
IN THE PAST 12 MONTHS, HAS THE LACK OF TRANSPORTATION KEPT YOU FROM MEDICAL APPOINTMENTS OR FROM GETTING MEDICATIONS?: NO

## 2021-06-24 SDOH — ECONOMIC STABILITY: TRANSPORTATION INSECURITY
IN THE PAST 12 MONTHS, HAS LACK OF TRANSPORTATION KEPT YOU FROM MEETINGS, WORK, OR FROM GETTING THINGS NEEDED FOR DAILY LIVING?: NO

## 2021-06-24 NOTE — PROGRESS NOTES
Community Health Worker Intake  • Social determinates of health intake : Declined   • Identified barriers to: None   • Contact information provided to Jerry Whitfield: Yes  • Has PCP appointment scheduled for: 07/01/2021 @ 2:30 pm   • Scheduled Food Delivery/Home Visit/Outpatient Visit: No  • Outpatient assessment completed.Declined      CHW Chencho called patient and introduced Community Care Management and patient states he is good and has all appointments scheduled. Patient is an active  . Patient did not want to answer any questions.   CHW Chencho will discharge patient from Community Care Management and remove from case load and master list as patient has no needs and has declined services.

## 2021-06-26 LAB
APPEARANCE STONE: NORMAL
COMPN STONE: NORMAL
NUMBER STONE: NORMAL
SIZE STONE: NORMAL MM
SPECIMEN WT: 156 MG

## 2021-07-01 ENCOUNTER — OFFICE VISIT (OUTPATIENT)
Dept: MEDICAL GROUP | Facility: PHYSICIAN GROUP | Age: 81
End: 2021-07-01
Payer: MEDICARE

## 2021-07-01 VITALS
TEMPERATURE: 98.5 F | SYSTOLIC BLOOD PRESSURE: 130 MMHG | DIASTOLIC BLOOD PRESSURE: 60 MMHG | OXYGEN SATURATION: 95 % | WEIGHT: 184.6 LBS | HEIGHT: 67 IN | HEART RATE: 85 BPM | BODY MASS INDEX: 28.97 KG/M2

## 2021-07-01 DIAGNOSIS — N13.2 HYDRONEPHROSIS WITH URINARY OBSTRUCTION DUE TO RENAL CALCULUS: ICD-10-CM

## 2021-07-01 DIAGNOSIS — E11.21 TYPE 2 DIABETES MELLITUS WITH DIABETIC NEPHROPATHY, WITHOUT LONG-TERM CURRENT USE OF INSULIN (HCC): ICD-10-CM

## 2021-07-01 DIAGNOSIS — Z85.72 HISTORY OF NON-HODGKIN'S LYMPHOMA: ICD-10-CM

## 2021-07-01 DIAGNOSIS — D69.6 THROMBOCYTOPENIA (HCC): ICD-10-CM

## 2021-07-01 DIAGNOSIS — I10 ESSENTIAL HYPERTENSION: ICD-10-CM

## 2021-07-01 PROBLEM — I16.0 HYPERTENSIVE URGENCY: Status: RESOLVED | Noted: 2021-06-20 | Resolved: 2021-07-01

## 2021-07-01 PROCEDURE — 99214 OFFICE O/P EST MOD 30 MIN: CPT | Performed by: FAMILY MEDICINE

## 2021-07-01 ASSESSMENT — FIBROSIS 4 INDEX: FIB4 SCORE: 3.96

## 2021-07-01 NOTE — ASSESSMENT & PLAN NOTE
Patient was given hydralazine 25 every 8 hrs and amlodipine 10 mg but had not picked these up  He had significant elevation in BP with recent admission due to pain from passing renal caliculi.   His bp today is normal 130/60  He is managing BP with diet.   Review log in a month.

## 2021-07-01 NOTE — ASSESSMENT & PLAN NOTE
He was treated for non hodgkins lympoma with rituxan and currently not on any medication and with no LAD, night sweats or fevers or weight loss or fatigue.

## 2021-07-01 NOTE — PROGRESS NOTES
Subjective:   Jerry Whitfield is a 81 y.o. male here today for evaluation and management of:     Essential hypertension  Patient was given hydralazine 25 every 8 hrs and amlodipine 10 mg but had not picked these up  He had significant elevation in BP with recent admission due to pain from passing renal caliculi.   His bp today is normal 130/60  He is managing BP with diet.   Review log in a month.     Type 2 diabetes mellitus with diabetic nephropathy, without long-term current use of insulin ([facility])  Results for JERRY WHITFIELD (MRN [id number]) as of 7/1/2021 14:52   Ref. Range 9/8/2017 06:57 7/2/2018 04:10 6/17/2021 00:00   Glycohemoglobin Latest Ref Range: 4.0 - 5.6 % 8.6 (H) 7.0 7.8 (H)   He stopped metformin today due to having diarrhea  Will recheck blood sugar log in a month.   We can discuss other medications that will not cause diarrhea.     Hydronephrosis with urinary obstruction due to renal calculus  Patient admitted twice in June for obstructing renal stone on right had stents place and thornton removed on discharge.   He has a follow up appointment with urology  He notes pain only with his bladder filling up and improved with voiding.   Likely from the JJ stent.   Will have follow up with me in a month.     Thrombocytopenia (HCC)  Chronic condition, stable, no adverse bleeding events.     History of non-Hodgkin's lymphoma  He was treated for non hodgkins lympoma with rituxan and currently not on any medication and with no LAD, night sweats or fevers or weight loss or fatigue.         Current medicines (including changes today)  Current Outpatient Medications   Medication Sig Dispense Refill   • atorvastatin (LIPITOR) 40 MG Tab Take 1 tablet by mouth every evening. 30 tablet 0   • cyanocobalamin (VITAMIN B12) 1000 MCG Tab Take 1 tablet by mouth every day. 30 tablet 0   • acetaminophen (TYLENOL) 500 MG Tab Take 1,000 mg by mouth every 8 hours as needed for Mild Pain.     • metFORMIN ER (GLUCOPHAGE  "XR) 500 MG TABLET SR 24 HR Take 1 tablet by mouth 2 times a day. If you develop too much diarrhea, reduce to 1 tablet once each day. 60 tablet 0     No current facility-administered medications for this visit.     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    ROS  No chest pain, no shortness of breath, no abdominal pain       Objective:     /60   Pulse 85   Temp 36.9 °C (98.5 °F)   Ht 1.702 m (5' 7\")   Wt 83.7 kg (184 lb 9.6 oz)   SpO2 95%  Body mass index is 28.91 kg/m².   Physical Exam:  Constitutional: Alert, no distress.  Skin: Warm, dry, good turgor, no rashes in visible areas.  Eye: Equal, round and reactive, conjunctiva clear, lids normal.  ENMT: Lips without lesions, good dentition, oropharynx clear.  Neck: Trachea midline, no masses, no thyromegaly. No cervical or supraclavicular lymphadenopathy  Respiratory: Unlabored respiratory effort, lungs clear to auscultation, no wheezes, no ronchi.  Cardiovascular: Normal S1, S2, no murmur, no edema.  Abdomen: Soft, non-tender, no masses, no hepatosplenomegaly.  Psych: Alert and oriented x3, normal affect and mood.        Assessment and Plan:   The following treatment plan was discussed    1. Essential hypertension      2. Type 2 diabetes mellitus with diabetic nephropathy, without long-term current use of insulin (HCC)    - Renal Function Panel; Future  - Lipid Profile; Future  - Microalbumin Creat Ratio Urine (Lab Collect); Future    3. Hydronephrosis with urinary obstruction due to renal calculus    - Renal Function Panel; Future    4. Thrombocytopenia (HCC)      5. History of non-Hodgkin's lymphoma        Followup: Return in about 4 weeks (around 7/29/2021) for f/v to review BS and BP log for HTN, DM2.         "

## 2021-07-01 NOTE — ASSESSMENT & PLAN NOTE
Patient admitted twice in June for obstructing renal stone on right had stents place and thornton removed on discharge.   He has a follow up appointment with urology  He notes pain only with his bladder filling up and improved with voiding.   Likely from the JJ stent.   Will have follow up with me in a month.

## 2021-07-01 NOTE — ASSESSMENT & PLAN NOTE
Results for KELVIN KAUFMAN (MRN [id number]) as of 7/1/2021 14:52   Ref. Range 9/8/2017 06:57 7/2/2018 04:10 6/17/2021 00:00   Glycohemoglobin Latest Ref Range: 4.0 - 5.6 % 8.6 (H) 7.0 7.8 (H)   He stopped metformin today due to having diarrhea  Will recheck blood sugar log in a month.   We can discuss other medications that will not cause diarrhea.

## 2021-07-17 ENCOUNTER — HOSPITAL ENCOUNTER (OUTPATIENT)
Dept: LAB | Facility: MEDICAL CENTER | Age: 81
End: 2021-07-17
Attending: FAMILY MEDICINE
Payer: MEDICARE

## 2021-07-17 DIAGNOSIS — E11.21 TYPE 2 DIABETES MELLITUS WITH DIABETIC NEPHROPATHY, WITHOUT LONG-TERM CURRENT USE OF INSULIN (HCC): ICD-10-CM

## 2021-07-17 DIAGNOSIS — N13.2 HYDRONEPHROSIS WITH URINARY OBSTRUCTION DUE TO RENAL CALCULUS: ICD-10-CM

## 2021-07-17 LAB
ALBUMIN SERPL BCP-MCNC: 4.3 G/DL (ref 3.2–4.9)
BUN SERPL-MCNC: 21 MG/DL (ref 8–22)
CALCIUM SERPL-MCNC: 9 MG/DL (ref 8.5–10.5)
CHLORIDE SERPL-SCNC: 106 MMOL/L (ref 96–112)
CHOLEST SERPL-MCNC: 160 MG/DL (ref 100–199)
CO2 SERPL-SCNC: 24 MMOL/L (ref 20–33)
CREAT SERPL-MCNC: 1.29 MG/DL (ref 0.5–1.4)
CREAT UR-MCNC: 107.4 MG/DL
FASTING STATUS PATIENT QL REPORTED: NORMAL
GLUCOSE SERPL-MCNC: 117 MG/DL (ref 65–99)
HDLC SERPL-MCNC: 33 MG/DL
LDLC SERPL CALC-MCNC: 107 MG/DL
MICROALBUMIN UR-MCNC: 6.7 MG/DL
MICROALBUMIN/CREAT UR: 62 MG/G (ref 0–30)
PHOSPHATE SERPL-MCNC: 3.5 MG/DL (ref 2.5–4.5)
POTASSIUM SERPL-SCNC: 4.3 MMOL/L (ref 3.6–5.5)
SODIUM SERPL-SCNC: 141 MMOL/L (ref 135–145)
TRIGL SERPL-MCNC: 102 MG/DL (ref 0–149)

## 2021-07-17 PROCEDURE — 82043 UR ALBUMIN QUANTITATIVE: CPT

## 2021-07-17 PROCEDURE — 36415 COLL VENOUS BLD VENIPUNCTURE: CPT

## 2021-07-17 PROCEDURE — 80061 LIPID PANEL: CPT

## 2021-07-17 PROCEDURE — 80069 RENAL FUNCTION PANEL: CPT

## 2021-07-17 PROCEDURE — 82570 ASSAY OF URINE CREATININE: CPT

## 2021-07-19 NOTE — RESULT ENCOUNTER NOTE
Released to [facility],  Your labs show kidney flow rate has improved! There is a small amount of protein in the urine, blood sugar and cholesterol is mildly elevated.   Stay well hydrated with 8 glasses of water daily, reduce sugary and fatty foods or fried foods.   You are already on a good cholesterol medication so no changes needed there.   Dick Saucedo M.D.

## 2022-12-07 NOTE — CARE PLAN
The patient is Stable - Low risk of patient condition declining or worsening    Shift Goals  Clinical Goals: Control pain.  Patient Goals: Control pain.     Progress made toward(s) clinical / shift goals:    Problem: Pain - Standard  Goal: Alleviation of pain or a reduction in pain to the patient’s comfort goal  Outcome: Progressing  Note: Pain controlled with PRN pain med regimen.  Continue to monitor.       Problem: Respiratory  Goal: Patient will achieve/maintain optimum respiratory ventilation and gas exchange  Outcome: Progressing       Patient is not progressing towards the following goals:       100.4 F

## 2023-05-02 ENCOUNTER — TELEPHONE (OUTPATIENT)
Dept: HEALTH INFORMATION MANAGEMENT | Facility: OTHER | Age: 83
End: 2023-05-02
Payer: MEDICARE

## 2023-09-01 ENCOUNTER — TELEPHONE (OUTPATIENT)
Dept: HEALTH INFORMATION MANAGEMENT | Facility: OTHER | Age: 83
End: 2023-09-01
Payer: MEDICARE

## 2023-10-06 ENCOUNTER — TELEPHONE (OUTPATIENT)
Dept: HEALTH INFORMATION MANAGEMENT | Facility: OTHER | Age: 83
End: 2023-10-06
Payer: MEDICARE

## (undated) DEVICE — ELECTRODE 850 FOAM ADHESIVE - HYDROGEL RADIOTRNSPRNT (50/PK)

## (undated) DEVICE — WIRE GUIDE SENSOR DUAL FLEX - 5/BX

## (undated) DEVICE — CONNECTOR HOSE NEPTUNE FOR CYSTO ROOM

## (undated) DEVICE — SUCTION INSTRUMENT YANKAUER BULBOUS TIP W/O VENT (50EA/CA)

## (undated) DEVICE — BAG URODRAIN WITH TUBING - (20/CA)

## (undated) DEVICE — WATER IRRIGATION STERILE 1000ML (12EA/CA)

## (undated) DEVICE — SPONGE GAUZESTER 4 X 4 4PLY - (128PK/CA)

## (undated) DEVICE — SLEEVE, VASO, THIGH, MED

## (undated) DEVICE — PROTECTOR ULNA NERVE - (36PR/CA)

## (undated) DEVICE — PACK CYSTO III (2EA/CA)

## (undated) DEVICE — HEAD HOLDER JUNIOR/ADULT

## (undated) DEVICE — SHEATH NAVIGATOR 11/13 X 36 URETERAL ACCESS

## (undated) DEVICE — TOWEL STOP TIMEOUT SAFETY FLAG (40EA/CA)

## (undated) DEVICE — COVER FOOT UNIVERSAL DISP. - (25EA/CA)

## (undated) DEVICE — TUBING CLEARLINK DUO-VENT - C-FLO (48EA/CA)

## (undated) DEVICE — GOWN SURGICAL X-LARGE ULTRA - FILM-REINFORCED (20/CA)

## (undated) DEVICE — SET LEADWIRE 5 LEAD BEDSIDE DISPOSABLE ECG (1SET OF 5/EA)

## (undated) DEVICE — SENSOR SPO2 NEO LNCS ADHESIVE (20/BX) SEE USER NOTES

## (undated) DEVICE — GLOVE BIOGEL PI INDICATOR SZ 6.5 SURGICAL PF LF - (50/BX 4BX/CA)

## (undated) DEVICE — GLOVE BIOGEL SZ 7.5 SURGICAL PF LTX - (50PR/BX 4BX/CA)

## (undated) DEVICE — BASKET ZERO TIP

## (undated) DEVICE — GLOVE BIOGEL SZ 6.5 SURGICAL PF LTX (50PR/BX 4BX/CA)

## (undated) DEVICE — GOWN WARMING STANDARD FLEX - (30/CA)

## (undated) DEVICE — CONTAINER SPECIMEN BAG OR - STERILE 4 OZ W/LID (100EA/CA)

## (undated) DEVICE — GOWN SURGEONS X-LARGE - DISP. (30/CA)

## (undated) DEVICE — KIT ANESTHESIA W/CIRCUIT & 3/LT BAG W/FILTER (20EA/CA)

## (undated) DEVICE — GUIDEWIRE AMPLATZ STIFF .035 145CM 7 STRAIGHT (5/BX)

## (undated) DEVICE — MASK ANESTHESIA ADULT  - (100/CA)

## (undated) DEVICE — CATHETER URETHRAL OPEN END AXXCESS (10EA/BX)

## (undated) DEVICE — WATER IRRIG. STER 3000 ML - (4/CA)

## (undated) DEVICE — SYRINGE DISP. 12 CC LL - (100/BX)

## (undated) DEVICE — LACTATED RINGERS INJ 1000 ML - (14EA/CA 60CA/PF)

## (undated) DEVICE — SCOPE DIGITAL URETEROSCOPE DISPOSABLE

## (undated) DEVICE — PACK CYSTOSCOPY III - (8/CA)

## (undated) DEVICE — FIBER LASER MOSES 200 UM (1/EA)

## (undated) DEVICE — CATHETER URET DUAL LUMEN

## (undated) DEVICE — TOWELS CLOTH SURGICAL - (4/PK 20PK/CA)

## (undated) DEVICE — SET EXTENSION WITH 2 PORTS (48EA/CA) ***PART #2C8610 IS A SUBSTITUTE*****

## (undated) DEVICE — GLOVE BIOGEL PI INDICATOR SZ 7.5 SURGICAL PF LF -(50/BX 4BX/CA)

## (undated) DEVICE — SET IRRIGATION CYSTOSCOPY Y-TYPE L81 IN (20EA/CA)

## (undated) DEVICE — SODIUM CHL. IRRIGATION 0.9% 3000ML (4EA/CA 65CA/PF)

## (undated) DEVICE — JELLY SURGILUBE STERILE TUBE 4.25 OZ (1/EA)

## (undated) DEVICE — NEPTUNE 4 PORT MANIFOLD - (20/PK)

## (undated) DEVICE — MEDICINE CUP STERILE 2 OZ - (100/CA)

## (undated) DEVICE — TUBE CONNECT SUCTION CLEAR 120 X 1/4" (50EA/CA)"

## (undated) DEVICE — CATHETER URETHRAL FOLEY SILICONE OD18 FR 10 ML (10EA/CA)